# Patient Record
Sex: FEMALE | Race: WHITE | NOT HISPANIC OR LATINO | Employment: PART TIME | URBAN - METROPOLITAN AREA
[De-identification: names, ages, dates, MRNs, and addresses within clinical notes are randomized per-mention and may not be internally consistent; named-entity substitution may affect disease eponyms.]

---

## 2019-03-04 ENCOUNTER — TRANSFERRED RECORDS (OUTPATIENT)
Dept: HEALTH INFORMATION MANAGEMENT | Facility: CLINIC | Age: 34
End: 2019-03-04

## 2019-03-27 ENCOUNTER — OFFICE VISIT (OUTPATIENT)
Dept: OPHTHALMOLOGY | Facility: CLINIC | Age: 34
End: 2019-03-27
Attending: OPHTHALMOLOGY
Payer: COMMERCIAL

## 2019-03-27 DIAGNOSIS — H53.10 SUBJECTIVE VISUAL DISTURBANCE: Primary | ICD-10-CM

## 2019-03-27 DIAGNOSIS — H04.123 DRY EYE SYNDROME OF BOTH EYES: ICD-10-CM

## 2019-03-27 PROCEDURE — 92133 CPTRZD OPH DX IMG PST SGM ON: CPT | Mod: ZF | Performed by: OPHTHALMOLOGY

## 2019-03-27 PROCEDURE — 92083 EXTENDED VISUAL FIELD XM: CPT | Mod: ZF | Performed by: OPHTHALMOLOGY

## 2019-03-27 PROCEDURE — G0463 HOSPITAL OUTPT CLINIC VISIT: HCPCS | Mod: ZF

## 2019-03-27 RX ORDER — DEXTROAMPHETAMINE SACCHARATE, AMPHETAMINE ASPARTATE MONOHYDRATE, DEXTROAMPHETAMINE SULFATE AND AMPHETAMINE SULFATE 7.5; 7.5; 7.5; 7.5 MG/1; MG/1; MG/1; MG/1
30 CAPSULE, EXTENDED RELEASE ORAL DAILY
COMMUNITY
Start: 2019-03-04

## 2019-03-27 RX ORDER — RABEPRAZOLE SODIUM 20 MG/1
TABLET, DELAYED RELEASE ORAL DAILY
COMMUNITY
Start: 2017-05-26

## 2019-03-27 RX ORDER — LORAZEPAM 1 MG/1
TABLET ORAL
Refills: 0 | COMMUNITY
Start: 2019-01-29

## 2019-03-27 RX ORDER — AZATHIOPRINE 100 MG/1
150 TABLET ORAL DAILY
COMMUNITY

## 2019-03-27 ASSESSMENT — VISUAL ACUITY
OD_SC: 20/60
OD_CC: J7
METHOD: SNELLEN - LINEAR
OS_CC: J5
OD_SC+: -2
OS_SC: 20/100

## 2019-03-27 ASSESSMENT — CONF VISUAL FIELD
OS_INFERIOR_TEMPORAL_RESTRICTION: 3
OD_SUPERIOR_TEMPORAL_RESTRICTION: 3
OD_INFERIOR_NASAL_RESTRICTION: 3
OS_SUPERIOR_NASAL_RESTRICTION: 3
OD_INFERIOR_TEMPORAL_RESTRICTION: 3
OS_INFERIOR_NASAL_RESTRICTION: 3

## 2019-03-27 ASSESSMENT — EXTERNAL EXAM - RIGHT EYE: OD_EXAM: NORMAL

## 2019-03-27 ASSESSMENT — EXTERNAL EXAM - LEFT EYE: OS_EXAM: NORMAL

## 2019-03-27 ASSESSMENT — CUP TO DISC RATIO
OD_RATIO: 0.2
OS_RATIO: 0.2

## 2019-03-27 ASSESSMENT — TONOMETRY
OS_IOP_MMHG: 22
OD_IOP_MMHG: 20
IOP_METHOD: TONOPEN

## 2019-03-27 ASSESSMENT — SLIT LAMP EXAM - LIDS
COMMENTS: NORMAL
COMMENTS: NORMAL

## 2019-03-27 ASSESSMENT — REFRACTION_MANIFEST
OD_SPHERE: PLANO
OS_SPHERE: -1.25

## 2019-03-27 NOTE — LETTER
2019    RE: Andreina Moeller  : 1985  MRN: [id number]    Dear Dr. Hoff    Thank you for referring your patient, Andreina Moeller, to my neuro-ophthalmology clinic recently.  After a thorough neuro-ophthalmic history and examination, I came to the following conclusions:      1. Wegener's granulomatosis based upon serum testing and lacrimal biopsy previously- no evidence today for active disease affecting vision.    2. Dry eyes, both eyes with prominent slit lamp exam findings of superficial punctate epithelial erosions.  I suspect this may be the underlying etiology for her subjective visual disturbance.  I will see patient back in several months however to ensure that I am not missing a very early optic neuropathy.  There was no evidence of this today, however.    Andreina Moeller is a 33 year old female who presents for evaluation of eye pain, worsening vision, and peripheral vision changes. Patient has a history of Zoraida's granulomatosis (lacrimal gland biopsy proven in ) and has been on Azathioprine since for the past 7 years. Patient has had Rituximab  infusions in the past. Since 2018 patient has had worsening vision with eye pain with worse symptoms in the right than left. She reports having a goopy discharge in the morning with dryness in her lower eye lids. Reports her eye feels like sand paper and she has tried using artifical tears which provide immediate relief but states the pain returns not long after. She reports a history of headaches since the diagnosis of Wegener's and reports worsening headaches since last August. Reports monocular diplopia with her right eye. Reports floaters in the right eye which have been stable. No flashes.    Past ocular history: Lacrimal gland mass s/p biopsy in     Patient is P-ANCA positive on labwork from 2017 and has had multiple elevated blood tests for myeloperoxidase antibody dating back to 2011    Medications:  Azathioprine, Adderall, Lorazepam, Rabeprazole.  Last Rituxan infusion was August 2018.      She follows with rheumatologist Dr. Marifer Santiago at [facility].    Social history: Current smoker (1/4 pack per day)    MRI orbit with and without contrast (1/11/19)  Radiology read:  1.  Bilateral lacrimal gland inflammation noted on 4/15/2011 has resolved.  2.  Subtle asymmetry of the left lacrimal gland again visualized.  3.  The intraorbital contents are otherwise normal.  4.  Normal MRI appearance of the brain.    On exam visual acuity is 20/60 in the right and 20/100 in the left. Pupil exam is normal with no afferent pupillary defect. Color plates are full with 2/11 out of 3/11. She reports a history of some difficulty with color vision for years but not clear if congenitally partially color blind or not. She has some difficulty with the control plate number 12 as well. Extraocular movements are full.  Alignment is unremarkable. Anterior segment exam is normal in both eyes EXCEPT for moderate to severe superficial punctate epithelial erosions bilaterally in the inferior cornea. There is no anterior chamber cell or flare. Fundus exam is normal in both eyes. There is no optic disc swelling in either eye. Cranial nerves V1-3, 7,8,9,11, and 12 were normal bilaterally.     Visual field shows generalized depression in both eyes without a clear pattern. Retinal nerve fiber layer is normal in both eyes on peripapillary OCT.  Macular sections also appeared normal.    I reviewed 24-2 visual field testing from 3/4/19 from Dr. Hoff's office which also showed global depression in the right eye and in the left eye.  There was not a clear pattern of visual field loss on these fields either.    It is my impression that Ms. Moeller's symptoms are related to surface dryness rather than active GPA / Wegener's.  I did not arrive at this decision without careful evaluation for orbital or optic nerve disease, however. She has  significant punctate epithelial erosions in both eyes. Her monocular diplopia and symptoms are consistent with surface disease. PAP testing performed by myself in clinic resulted in J1+ (-2) (20/20 equivalent) in the right eye and J1 (-1) (20/25 equivalent) in the left eye- we would not expect such a profound improvement on PAP if she had an optic neuropathy causing her distance visual acuity of 20/60 in the right eye and 20/100 in the left eye. I personally reviewed her MRI orbits with and without contrast from Jan 2019. There are no signs of active Zoraida's on her MRI consistent with the radiology read. Exam of her optic nerve and retina are also normal including OCT. I have recommended aggressive lubrication with artifical tears throughout the day and artifical tears ointment at bedtime. I will have her follow up with me in 2 months with repeat visual field and retinal nerve fiber layer OCT to ensure we are not missing a very early optic neuropathy.    Letter to Dr. Hoff.    Again, thank you for trusting me with the care of your patient.  For further exam details, please feel free to contact our office for additional records.  If you wish to contact me regarding this patient please email me at [email or web address] or give my clinic a call to arrange a phone conversation.    Sincerely,    Kelton Wheeler MD  , Neuro-Ophthalmology and Adult Strabismus Surgery  The [facility] VIEIRA and Miya Rodriguez Chair in Neuro-Ophthalmology  Department of Ophthalmology and Visual Neurosciences  [facility]    DX: dry eye syndrome, GPA diagnosis

## 2019-03-27 NOTE — NURSING NOTE
Chief Complaints and History of Present Illnesses   Patient presents with     New Patient     Chief Complaint(s) and History of Present Illness(es)     New patient is here for blurry vision.  She notes that in the last six months she has had blurry right eye vision, right eye discharge, intermittent double vision and right eye discomfort.  She has Jacob's.  The patient had a biopsy in 2010 of the right lacrimal gland.  FORREST Childress 1:42 PM 03/27/2019

## 2019-03-27 NOTE — PROGRESS NOTES
1. Wegener's granulomatosis based upon serum testing and lacrimal biopsy previously- no evidence today for active disease affecting vision.    2. Dry eyes, both eyes with prominent slit lamp exam findings of superficial punctate epithelial erosions.  I suspect this may be the underlying etiology for her subjective visual disturbance.  I will see patient back in several months however to ensure that I am not missing a very early optic neuropathy.  There was no evidence of this today, however.    Andreina Moeller is a 33 year old female who presents for evaluation of eye pain, worsening vision, and peripheral vision changes. Patient has a history of Zoraida's granulomatosis (lacrimal gland biopsy proven in 2012) and has been on Azathioprine since for the past 7 years. Patient has had Rituximab  infusions in the past. Since August of 2018 patient has had worsening vision with eye pain with worse symptoms in the right than left. She reports having a goopy discharge in the morning with dryness in her lower eye lids. Reports her eye feels like sand paper and she has tried using artifical tears which provide immediate relief but states the pain returns not long after. She reports a history of headaches since the diagnosis of Wegener's and reports worsening headaches since last August. Reports monocular diplopia with her right eye. Reports floaters in the right eye which have been stable. No flashes.    Past ocular history: Lacrimal gland mass s/p biopsy in 2012    Patient is P-ANCA positive on labwork from 05/2017 and has had multiple elevated blood tests for myeloperoxidase antibody dating back to 05/2011    Medications: Azathioprine, Adderall, Lorazepam, Rabeprazole.  Last Rituxan infusion was August 2018.      She follows with rheumatologist Dr. Marifer Santiago at [facility].    Social history: Current smoker (1/4 pack per day)    MRI orbit with and without contrast (1/11/19)  Radiology read:  1.  Bilateral lacrimal  gland inflammation noted on 4/15/2011 has resolved.  2.  Subtle asymmetry of the left lacrimal gland again visualized.  3.  The intraorbital contents are otherwise normal.  4.  Normal MRI appearance of the brain.    On exam visual acuity is 20/60 in the right and 20/100 in the left. Pupil exam is normal with no afferent pupillary defect. Color plates are full with 2/11 out of 3/11. She reports a history of some difficulty with color vision for years but not clear if congenitally partially color blind or not. She has some difficulty with the control plate number 12 as well. Extraocular movements are full.  Alignment is unremarkable. Anterior segment exam is normal in both eyes EXCEPT for moderate to severe superficial punctate epithelial erosions bilaterally in the inferior cornea. There is no anterior chamber cell or flare. Fundus exam is normal in both eyes. There is no optic disc swelling in either eye. Cranial nerves V1-3, 7,8,9,11, and 12 were normal bilaterally.     Visual field shows generalized depression in both eyes without a clear pattern. Retinal nerve fiber layer is normal in both eyes on peripapillary OCT.  Macular sections also appeared normal.    I reviewed 24-2 visual field testing from 3/4/19 from Dr. Hoff's office which also showed global depression in the right eye and in the left eye.  There was not a clear pattern of visual field loss on these fields either.    It is my impression that Ms. Moeller's symptoms are related to surface dryness rather than active GPA / Wegener's.  I did not arrive at this decision without careful evaluation for orbital or optic nerve disease, however. She has significant punctate epithelial erosions in both eyes. Her monocular diplopia and symptoms are consistent with surface disease. PAP testing performed by myself in clinic resulted in J1+ (-2) (20/20 equivalent) in the right eye and J1 (-1) (20/25 equivalent) in the left eye- we would not expect such a  profound improvement on PAP if she had an optic neuropathy causing her distance visual acuity of 20/60 in the right eye and 20/100 in the left eye. I personally reviewed her MRI orbits with and without contrast from Jan 2019. There are no signs of active Zoraida's on her MRI consistent with the radiology read. Exam of her optic nerve and retina are also normal including OCT. I have recommended aggressive lubrication with artifical tears throughout the day and artifical tears ointment at bedtime. I will have her follow up with me in 2 months with repeat visual field and retinal nerve fiber layer OCT to ensure we are not missing a very early optic neuropathy.    Letter to Dr. Hoff.     I spent a total of 60 minutes face to face with Andreina Moeller during today's office visit.  Over 50% of this time was spent counseling the patient and/or coordinating care regarding her GPA diagnosis and bilateral blurred vision.    Complete documentation of historical and exam elements from today's encounter can be found in the full encounter summary report (not reduplicated in this progress note).  I personally obtained the chief complaint(s) and history of present illness.  I confirmed and edited as necessary the review of systems, past medical/surgical history, family history, social history, and examination findings as documented by others; and I examined the patient myself.  I personally reviewed the relevant tests, images, and reports as documented above.  I formulated and edited as necessary the assessment and plan and discussed the findings and management plan with the patient and family.  I personally reviewed the ophthalmic test(s) associated with this encounter, agree with the interpretation(s) as documented by the resident/fellow, and have edited the corresponding report(s) as necessary.     MD Clinton Green MD  PGY-3 Ophthalmology Resident  [phone]

## 2019-04-01 ENCOUNTER — TRANSFERRED RECORDS (OUTPATIENT)
Dept: HEALTH INFORMATION MANAGEMENT | Facility: CLINIC | Age: 34
End: 2019-04-01

## 2019-04-19 ENCOUNTER — HEALTH MAINTENANCE LETTER (OUTPATIENT)
Age: 34
End: 2019-04-19

## 2019-06-19 ENCOUNTER — TRANSFERRED RECORDS (OUTPATIENT)
Dept: HEALTH INFORMATION MANAGEMENT | Facility: CLINIC | Age: 34
End: 2019-06-19

## 2019-11-04 ENCOUNTER — HEALTH MAINTENANCE LETTER (OUTPATIENT)
Age: 34
End: 2019-11-04

## 2020-11-03 ENCOUNTER — MEDICAL CORRESPONDENCE (OUTPATIENT)
Dept: HEALTH INFORMATION MANAGEMENT | Facility: CLINIC | Age: 35
End: 2020-11-03

## 2020-11-22 ENCOUNTER — HEALTH MAINTENANCE LETTER (OUTPATIENT)
Age: 35
End: 2020-11-22

## 2021-05-25 ENCOUNTER — RECORDS - HEALTHEAST (OUTPATIENT)
Dept: ADMINISTRATIVE | Facility: CLINIC | Age: 36
End: 2021-05-25

## 2021-05-31 ENCOUNTER — RECORDS - HEALTHEAST (OUTPATIENT)
Dept: ADMINISTRATIVE | Facility: CLINIC | Age: 36
End: 2021-05-31

## 2021-06-03 ENCOUNTER — RECORDS - HEALTHEAST (OUTPATIENT)
Dept: ADMINISTRATIVE | Facility: CLINIC | Age: 36
End: 2021-06-03

## 2021-09-18 ENCOUNTER — HEALTH MAINTENANCE LETTER (OUTPATIENT)
Age: 36
End: 2021-09-18

## 2022-01-08 ENCOUNTER — HEALTH MAINTENANCE LETTER (OUTPATIENT)
Age: 37
End: 2022-01-08

## 2022-01-25 ENCOUNTER — TRANSFERRED RECORDS (OUTPATIENT)
Dept: HEALTH INFORMATION MANAGEMENT | Facility: CLINIC | Age: 37
End: 2022-01-25
Payer: COMMERCIAL

## 2022-01-25 ENCOUNTER — TELEPHONE (OUTPATIENT)
Dept: OPHTHALMOLOGY | Facility: CLINIC | Age: 37
End: 2022-01-25
Payer: COMMERCIAL

## 2022-01-25 NOTE — TELEPHONE ENCOUNTER
Called and spoke to Andreina Hdz saw Dr. Wheeler in 2019     Since then Dr. Modesta Alanis has been following this pt.     Since then in the last 2 months Andreina's vision has decrease and the last six it has diminished more. Dr. Alanis would like this pt to be seen with Dr. Wheeler.    RT Eye Pressure was 32 a month ago     Extreme Migraines    Red / Itchy/ painful to touch at times    Floaters     Andreina almost failed her vision/ color test     She dose not see well at night     Can't focus     She can't see dark color well     Her next vision test is 2/15 with Dr. Alanis     I asked her if she have [facility] fax her records to us.     Alissa Perez Communication Facilitator on 1/25/2022 at 11:45 AM

## 2022-01-25 NOTE — TELEPHONE ENCOUNTER
[facility] Call Center    Phone Message    May a detailed message be left on voicemail: yes     Reason for Call: Symptoms or Concerns     If patient has red-flag symptoms, warm transfer to triage line    Current symptom or concern: drastic Changes in Vision, painful, dry eyes    Symptoms have been present for:  several month(s)    Has patient previously been seen for this? Yes    By : Dr Wheeler    Date: 3/27/2019    Are there any new or worsening symptoms? Yes      Action Taken: Message routed to:  [facility] eye    Travel Screening: Not Applicable

## 2022-01-28 ENCOUNTER — TELEPHONE (OUTPATIENT)
Dept: OPHTHALMOLOGY | Facility: CLINIC | Age: 37
End: 2022-01-28
Payer: COMMERCIAL

## 2022-01-28 NOTE — TELEPHONE ENCOUNTER
Do you want me to schedule with Dr. Wheeler? Her notes were sent to you.     Alissa Perez Communication Facilitator on 1/28/2022 at 10:20 AM

## 2022-01-28 NOTE — TELEPHONE ENCOUNTER
[facility] Call Center    Phone Message    May a detailed message be left on voicemail: yes     Reason for Call: Other: Pt.requesting to talk to a clinic nurse about eye symptoms.  Pt is in the process of getting her records faxed to Dr. Wheeler. Pt previously spoke with Alissa regarding her situation, per Pt.  Please call Pt back to discuss.     Thank you.    Action Taken: Message routed to:  [facility] EYE    Travel Screening: Not Applicable

## 2022-01-28 NOTE — TELEPHONE ENCOUNTER
Called and spoke to Andreina Hdz an appt with Dr. Wilkerson for 2/18 @ 1230 pm. I mailed out a new pt packet with time, date and a map.     Andreina was ok with this appt.     Alissa Perez Communication Facilitator on 1/28/2022 at 11:11 AM

## 2022-01-31 ENCOUNTER — TRANSCRIBE ORDERS (OUTPATIENT)
Dept: OTHER | Age: 37
End: 2022-01-31

## 2022-01-31 DIAGNOSIS — H53.50 COLOR VISION DEFECT: ICD-10-CM

## 2022-01-31 DIAGNOSIS — H54.7 WORSENING VISION: Primary | ICD-10-CM

## 2022-02-18 ENCOUNTER — OFFICE VISIT (OUTPATIENT)
Dept: OPHTHALMOLOGY | Facility: CLINIC | Age: 37
End: 2022-02-18
Attending: OPHTHALMOLOGY
Payer: COMMERCIAL

## 2022-02-18 DIAGNOSIS — H53.10 SUBJECTIVE VISUAL DISTURBANCE: Primary | ICD-10-CM

## 2022-02-18 DIAGNOSIS — H53.50 COLOR VISION DEFECT: ICD-10-CM

## 2022-02-18 DIAGNOSIS — H54.7 WORSENING VISION: ICD-10-CM

## 2022-02-18 DIAGNOSIS — H53.10 SUBJECTIVE VISUAL DISTURBANCE: ICD-10-CM

## 2022-02-18 PROCEDURE — 92083 EXTENDED VISUAL FIELD XM: CPT | Performed by: INTERNAL MEDICINE

## 2022-02-18 PROCEDURE — G0463 HOSPITAL OUTPT CLINIC VISIT: HCPCS | Mod: 25

## 2022-02-18 PROCEDURE — 99214 OFFICE O/P EST MOD 30 MIN: CPT | Performed by: INTERNAL MEDICINE

## 2022-02-18 PROCEDURE — 92133 CPTRZD OPH DX IMG PST SGM ON: CPT | Performed by: INTERNAL MEDICINE

## 2022-02-18 ASSESSMENT — SLIT LAMP EXAM - LIDS
COMMENTS: NORMAL
COMMENTS: NORMAL

## 2022-02-18 ASSESSMENT — CUP TO DISC RATIO
OS_RATIO: 0.2
OD_RATIO: 0.2

## 2022-02-18 ASSESSMENT — TONOMETRY
OS_IOP_MMHG: 18
OS_IOP_MMHG: 25
OD_IOP_MMHG: 27
IOP_METHOD: ICARE
IOP_METHOD: APPLANATION
OD_IOP_MMHG: 20

## 2022-02-18 ASSESSMENT — CONF VISUAL FIELD
OD_INFERIOR_NASAL_RESTRICTION: 3
OS_NORMAL: 1
OD_INFERIOR_TEMPORAL_RESTRICTION: 3
METHOD: COUNTING FINGERS

## 2022-02-18 ASSESSMENT — VISUAL ACUITY
OD_PH_SC: 20/50
OS_SC: 20/80
METHOD: SNELLEN - LINEAR
OD_SC: 20/60
OS_PH_SC: 20/60
OD_PH_SC+: -1
OS_PH_SC+: +1

## 2022-02-18 ASSESSMENT — EXTERNAL EXAM - LEFT EYE: OS_EXAM: NORMAL

## 2022-02-18 ASSESSMENT — EXTERNAL EXAM - RIGHT EYE: OD_EXAM: NORMAL

## 2022-02-18 NOTE — PROGRESS NOTES
1. Dry eye syndrome -patient presented for evaluation of intermittent blurred vision in both eyes is worse in the last several months.  She continues to have marked anterior segment disease 3+ punctate epithelial erosions in both eyes and symptoms that are consistent with dry eye syndrome.  There is no evidence of posterior segment disease on exam or OCT today.  Visual fields continues to demonstrate nonspecific deficits that could be consistent with refractive disease.  Her treatment-refractive dry eye could be secondary to underlying Sjogren's as her anti-SSA in 2019 came back markedly elevated. She has an appointment for follow-up with rheumatology in April.    Advised her to increase use of preservative-free artificial tears, use refresh p.m. ointment before bed.  Could consider trial of Xiidra and or punctual plugs. She was recently started on Travatan which could theoretically worsen her eye irritation and she was advised to discuss alternatives for ocular hypertension with her regular ophthalmologist.    Follow-up with neuro-ophthalmology as needed.     Andreina Moeller is a pleasant 36 year old White female who presents to my neuro-ophthalmology clinic today for evaluation of blurred vision in both eyes.    HPI:  Patient reports that for the last several months she has noticed a significant worsening in the clarity of her vision in both eyes, right greater than left associated with redness, tearing, retro-orbital pain, and morning madarosis. Severe glare at nighttime with headlights to the point where she does not want to drive. She denies significant worsening pain with eye movement.  Occasional monocular double vision but denies binocular double vision or other transient or persistent neurologic symptoms. She has been using Restasis in both eyes twice a day and artificial tears 4-6 times per day every day.  Think these have helped somewhat but eyes are still very irritated and dry.    She has been on  Travatan in both eyes for two months for ocular hypertension.  Possibly has noted some worsening irritation since initiation of the drops.    Independent historians: Patient    Review of outside testing: Reviewed old imaging including MRI and CT. reviewed prior labs performed in 2019 that demonstrated elevated SSA antibody    My interpretation performed today of outside testing: Agree with interpretations listed in imaging reports    Review of outside clinical notes: Reviewed Dr. Wheeler's note from 3/27/2019.    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  Has follow-up scheduled with rheumatology in April.    Family history / social history: Reviewed and nonpertinent.    Exam:  Visual acuity without correction was 20/50 ph in the right eye and 20/60 ph in the left eye. IOP was 20 in the right eye and 18 in the left eye. Visual fields were full in both eyes. Ocular motility was full in all gaze directions. Color plates were 10/11 in the right eye and 4/11 in the left eye.  Pupils were equal, round and reactive to light with no RAPD.     Strabismus exam: full,ortho  Anterior segment exam: marked to severe PEE left > right  Dilated fundus exam: normal each eye no evidence of optic neuropathy    Tests ordered and interpreted today:  OCT rNFL demonstrated a mean NFL thickness of 103 in the right eye and 104 in the left eye. Thickness profile demonstrated normal thickness in all quadrants in the right eye and normal thickness in all quadrants in the left eye.  OCT macula performed that did not reveal significant retinal pathology or change from prior  VF: Glaucoma TOP visual field was performed. Non-specific deficits noted in both eyes..         44 minutes spent on the date of encounter doing chart review, history and exam, documentation, and further activities as noted above.     Bairon Wilkerson,    PGY-5 Neuro-Ophthalmology Fellow    Attending Physician Attestation:   Complete documentation of historical and exam elements from today's encounter can be found in the full encounter summary report (not reduplicated in this progress note).  I personally obtained the chief complaint(s) and history of present illness.  I confirmed and edited as necessary the review of systems, past medical/surgical history, family history, social history, and examination findings as documented by others; and I examined the patient myself.  I personally reviewed the relevant tests, images, and reports as documented above.  I formulated and edited as necessary the assessment and plan and discussed the findings and management plan with the patient and family. - Bairon Wilkerson, DO

## 2022-11-20 ENCOUNTER — HEALTH MAINTENANCE LETTER (OUTPATIENT)
Age: 37
End: 2022-11-20

## 2023-04-15 ENCOUNTER — HEALTH MAINTENANCE LETTER (OUTPATIENT)
Age: 38
End: 2023-04-15

## 2023-11-20 ENCOUNTER — HOSPITAL ENCOUNTER (EMERGENCY)
Facility: CLINIC | Age: 38
Discharge: HOME | End: 2023-11-21
Attending: FAMILY MEDICINE | Admitting: FAMILY MEDICINE
Payer: COMMERCIAL

## 2023-11-20 ENCOUNTER — APPOINTMENT (OUTPATIENT)
Dept: CT IMAGING | Facility: CLINIC | Age: 38
End: 2023-11-20
Attending: FAMILY MEDICINE
Payer: COMMERCIAL

## 2023-11-20 DIAGNOSIS — R10.9 ABDOMINAL PAIN OF UNKNOWN ETIOLOGY: ICD-10-CM

## 2023-11-20 PROBLEM — M31.30: Status: ACTIVE | Noted: 2023-11-20

## 2023-11-20 PROBLEM — F41.0 PANIC ATTACK: Status: ACTIVE | Noted: 2023-05-30

## 2023-11-20 PROBLEM — G56.01 CARPAL TUNNEL SYNDROME OF RIGHT WRIST: Status: ACTIVE | Noted: 2022-09-08

## 2023-11-20 PROBLEM — F39 EPISODIC MOOD DISORDER (H): Status: ACTIVE | Noted: 2020-01-02

## 2023-11-20 PROBLEM — F41.1 GENERALIZED ANXIETY DISORDER: Status: ACTIVE | Noted: 2019-03-20

## 2023-11-20 PROBLEM — M35.01: Status: ACTIVE | Noted: 2023-11-20

## 2023-11-20 PROBLEM — I77.6 VASCULITIS (H): Status: ACTIVE | Noted: 2023-08-08

## 2023-11-20 PROBLEM — Q51.810 ARCUATE UTERUS: Status: ACTIVE | Noted: 2022-09-21

## 2023-11-20 PROBLEM — F90.0 ATTENTION DEFICIT HYPERACTIVITY DISORDER (ADHD), PREDOMINANTLY INATTENTIVE TYPE: Status: ACTIVE | Noted: 2018-10-26

## 2023-11-20 LAB
ALBUMIN SERPL BCG-MCNC: 4.5 G/DL (ref 3.5–5.2)
ALBUMIN UR-MCNC: 30 MG/DL
ALP SERPL-CCNC: 60 U/L (ref 40–150)
ALT SERPL W P-5'-P-CCNC: 15 U/L (ref 0–50)
AMORPH CRY #/AREA URNS HPF: ABNORMAL /HPF
ANION GAP SERPL CALCULATED.3IONS-SCNC: 12 MMOL/L (ref 7–15)
APPEARANCE UR: ABNORMAL
AST SERPL W P-5'-P-CCNC: 13 U/L (ref 0–45)
BASOPHILS # BLD AUTO: 0 10E3/UL (ref 0–0.2)
BASOPHILS NFR BLD AUTO: 0 %
BILIRUB SERPL-MCNC: 0.2 MG/DL
BILIRUB UR QL STRIP: NEGATIVE
BUN SERPL-MCNC: 15.1 MG/DL (ref 6–20)
CALCIUM SERPL-MCNC: 9.3 MG/DL (ref 8.6–10)
CHLORIDE SERPL-SCNC: 105 MMOL/L (ref 98–107)
COLOR UR AUTO: YELLOW
CREAT SERPL-MCNC: 0.66 MG/DL (ref 0.51–0.95)
DEPRECATED HCO3 PLAS-SCNC: 20 MMOL/L (ref 22–29)
EGFRCR SERPLBLD CKD-EPI 2021: >90 ML/MIN/1.73M2
EOSINOPHIL # BLD AUTO: 0 10E3/UL (ref 0–0.7)
EOSINOPHIL NFR BLD AUTO: 0 %
ERYTHROCYTE [DISTWIDTH] IN BLOOD BY AUTOMATED COUNT: 11.8 % (ref 10–15)
GLUCOSE SERPL-MCNC: 143 MG/DL (ref 70–99)
GLUCOSE UR STRIP-MCNC: NEGATIVE MG/DL
HCG UR QL: NEGATIVE
HCT VFR BLD AUTO: 42.6 % (ref 35–47)
HGB BLD-MCNC: 14.5 G/DL (ref 11.7–15.7)
HGB UR QL STRIP: NEGATIVE
HOLD SPECIMEN: NORMAL
HOLD SPECIMEN: NORMAL
IMM GRANULOCYTES # BLD: 0 10E3/UL
IMM GRANULOCYTES NFR BLD: 0 %
KETONES UR STRIP-MCNC: NEGATIVE MG/DL
LEUKOCYTE ESTERASE UR QL STRIP: ABNORMAL
LYMPHOCYTES # BLD AUTO: 0.7 10E3/UL (ref 0.8–5.3)
LYMPHOCYTES NFR BLD AUTO: 9 %
MCH RBC QN AUTO: 30.9 PG (ref 26.5–33)
MCHC RBC AUTO-ENTMCNC: 34 G/DL (ref 31.5–36.5)
MCV RBC AUTO: 91 FL (ref 78–100)
MONOCYTES # BLD AUTO: 0.4 10E3/UL (ref 0–1.3)
MONOCYTES NFR BLD AUTO: 5 %
MUCOUS THREADS #/AREA URNS LPF: PRESENT /LPF
NEUTROPHILS # BLD AUTO: 6.3 10E3/UL (ref 1.6–8.3)
NEUTROPHILS NFR BLD AUTO: 86 %
NITRATE UR QL: NEGATIVE
NRBC # BLD AUTO: 0 10E3/UL
NRBC BLD AUTO-RTO: 0 /100
PH UR STRIP: 7 [PH] (ref 5–7)
PLATELET # BLD AUTO: 379 10E3/UL (ref 150–450)
POTASSIUM SERPL-SCNC: 4.2 MMOL/L (ref 3.4–5.3)
PROT SERPL-MCNC: 7.6 G/DL (ref 6.4–8.3)
RBC # BLD AUTO: 4.7 10E6/UL (ref 3.8–5.2)
RBC URINE: 0 /HPF
SODIUM SERPL-SCNC: 137 MMOL/L (ref 135–145)
SP GR UR STRIP: 1.02 (ref 1–1.03)
SQUAMOUS EPITHELIAL: 3 /HPF
UROBILINOGEN UR STRIP-MCNC: NORMAL MG/DL
WBC # BLD AUTO: 7.4 10E3/UL (ref 4–11)
WBC URINE: 3 /HPF

## 2023-11-20 PROCEDURE — 250N000011 HC RX IP 250 OP 636: Mod: JZ | Performed by: FAMILY MEDICINE

## 2023-11-20 PROCEDURE — 85025 COMPLETE CBC W/AUTO DIFF WBC: CPT | Performed by: FAMILY MEDICINE

## 2023-11-20 PROCEDURE — 81001 URINALYSIS AUTO W/SCOPE: CPT | Performed by: EMERGENCY MEDICINE

## 2023-11-20 PROCEDURE — 258N000003 HC RX IP 258 OP 636: Performed by: FAMILY MEDICINE

## 2023-11-20 PROCEDURE — 250N000009 HC RX 250: Performed by: FAMILY MEDICINE

## 2023-11-20 PROCEDURE — 96375 TX/PRO/DX INJ NEW DRUG ADDON: CPT

## 2023-11-20 PROCEDURE — 80053 COMPREHEN METABOLIC PANEL: CPT | Performed by: FAMILY MEDICINE

## 2023-11-20 PROCEDURE — 250N000011 HC RX IP 250 OP 636: Performed by: FAMILY MEDICINE

## 2023-11-20 PROCEDURE — 74177 CT ABD & PELVIS W/CONTRAST: CPT

## 2023-11-20 PROCEDURE — 81025 URINE PREGNANCY TEST: CPT | Performed by: FAMILY MEDICINE

## 2023-11-20 PROCEDURE — 96374 THER/PROPH/DIAG INJ IV PUSH: CPT | Mod: 59

## 2023-11-20 PROCEDURE — 80053 COMPREHEN METABOLIC PANEL: CPT | Performed by: EMERGENCY MEDICINE

## 2023-11-20 PROCEDURE — 36415 COLL VENOUS BLD VENIPUNCTURE: CPT | Performed by: EMERGENCY MEDICINE

## 2023-11-20 PROCEDURE — 99284 EMERGENCY DEPT VISIT MOD MDM: CPT | Performed by: FAMILY MEDICINE

## 2023-11-20 PROCEDURE — 96361 HYDRATE IV INFUSION ADD-ON: CPT

## 2023-11-20 PROCEDURE — 99285 EMERGENCY DEPT VISIT HI MDM: CPT | Mod: 25

## 2023-11-20 PROCEDURE — 81001 URINALYSIS AUTO W/SCOPE: CPT | Performed by: FAMILY MEDICINE

## 2023-11-20 PROCEDURE — 85025 COMPLETE CBC W/AUTO DIFF WBC: CPT | Performed by: EMERGENCY MEDICINE

## 2023-11-20 RX ORDER — IOPAMIDOL 755 MG/ML
77 INJECTION, SOLUTION INTRAVASCULAR ONCE
Status: COMPLETED | OUTPATIENT
Start: 2023-11-20 | End: 2023-11-20

## 2023-11-20 RX ORDER — HYDROMORPHONE HYDROCHLORIDE 1 MG/ML
0.5 INJECTION, SOLUTION INTRAMUSCULAR; INTRAVENOUS; SUBCUTANEOUS
Status: DISCONTINUED | OUTPATIENT
Start: 2023-11-20 | End: 2023-11-21 | Stop reason: HOSPADM

## 2023-11-20 RX ADMIN — SODIUM CHLORIDE 59 ML: 9 INJECTION, SOLUTION INTRAVENOUS at 23:19

## 2023-11-20 RX ADMIN — PROMETHAZINE HYDROCHLORIDE 12.5 MG: 25 INJECTION INTRAMUSCULAR; INTRAVENOUS at 23:36

## 2023-11-20 RX ADMIN — HYDROMORPHONE HYDROCHLORIDE 0.5 MG: 1 INJECTION, SOLUTION INTRAMUSCULAR; INTRAVENOUS; SUBCUTANEOUS at 23:36

## 2023-11-20 RX ADMIN — SODIUM CHLORIDE 1000 ML: 9 INJECTION, SOLUTION INTRAVENOUS at 21:51

## 2023-11-20 RX ADMIN — IOPAMIDOL 77 ML: 755 INJECTION, SOLUTION INTRAVENOUS at 23:19

## 2023-11-20 ASSESSMENT — ACTIVITIES OF DAILY LIVING (ADL)
ADLS_ACUITY_SCORE: 35
ADLS_ACUITY_SCORE: 35

## 2023-11-21 VITALS
OXYGEN SATURATION: 100 % | BODY MASS INDEX: 26.26 KG/M2 | HEART RATE: 64 BPM | SYSTOLIC BLOOD PRESSURE: 109 MMHG | WEIGHT: 157.6 LBS | RESPIRATION RATE: 18 BRPM | TEMPERATURE: 98.3 F | HEIGHT: 65 IN | DIASTOLIC BLOOD PRESSURE: 80 MMHG

## 2023-11-21 RX ORDER — OXYCODONE HYDROCHLORIDE 5 MG/1
5 TABLET ORAL EVERY 6 HOURS PRN
Qty: 10 TABLET | Refills: 0 | Status: SHIPPED | OUTPATIENT
Start: 2023-11-21

## 2023-11-21 RX ORDER — PROMETHAZINE HYDROCHLORIDE 25 MG/1
25 TABLET ORAL EVERY 6 HOURS PRN
Qty: 20 TABLET | Refills: 0 | Status: SHIPPED | OUTPATIENT
Start: 2023-11-21

## 2023-11-21 NOTE — DISCHARGE INSTRUCTIONS
The cause of your abdominal pain is uncertain at this point however imaging and lab diagnostics are reassuring that there is no medically or surgically emergent problem to explain it.  I would recommend outpatient referral to general surgery for their evaluation.  Simple pain medications such as acetaminophen or ibuprofen as baseline pain medications and you may use the oxycodone prescribed for breakthrough pain sparingly and for the short-term only.  Phenergan may be used for nausea/vomiting.  Please follow-up in clinic with your primary care provider.

## 2023-11-21 NOTE — ED NOTES
"Pt states that she has multiple autoimmune disorders and was seen at [facility] recently with a \"bad experience\" Pt states that she passed out at home due to abdominal pain and went to [facility] pt states \"all they diagnosed me with is colitis and a collapsed ovary\" since discharge pt is still have left side abdominal pain that wraps around the back on the left side. Pt report  chills and sweating, increased heart rate, difficulty voiding. Pt states that its hard for her to void and \"not much comes out\"     Writer bladder scanned pt and the most recorded was 64 mL.   "

## 2023-11-21 NOTE — ED TRIAGE NOTES
Pt presents for evaluation of flank pain in addition to chronic morbidities. [facility] and started on steroids and abx for collitis/crohns. Here for increasing pain and nausea.     Triage Assessment (Adult)       Row Name 11/20/23 3002          Triage Assessment    Airway WDL WDL        Respiratory WDL    Respiratory WDL WDL        Skin Circulation/Temperature WDL    Skin Circulation/Temperature WDL WDL        Cardiac WDL    Cardiac WDL WDL        Peripheral/Neurovascular WDL    Peripheral Neurovascular WDL WDL        Cognitive/Neuro/Behavioral WDL    Cognitive/Neuro/Behavioral WDL WDL

## 2023-11-21 NOTE — ED PROVIDER NOTES
"  History     Chief Complaint   Patient presents with    Flank Pain            HPI  Andreina Moeller is a 38 year old female, past medical history is significant for Wegener's granulomatosis without renal involvement, Sjogren syndrome, vasculitis, panic attacks, arcuate uterus, episodic mood disorder, generalized anxiety disorder, ADHD, tobacco use disorder, ANCA associated vasculitis, pseudotumor, presents to the emergency department with concerns of left-sided abdominal pain that dates back approximately 3-4 months and was seen recently this past week at [facility].  The patient states that she is a very complicated patient with multiple autoimmune disorders and was seen at [facility] this past week and had a \"bad experience\".  She states that she was seen for her abdominal pain and was diagnosed with colitis and a collapsed ovary and discharged on oxycodone 10 mg for pain which she is now out of, ongoing pain despite the use of the oxycodone, and also chief concern of decreased urine output over the past week.  She states she cannot remember the last time that she urinated a normal amount.  Only small amounts.  No urge or dysuria.  No hematuria.  She denies fever chills or sweats.  Ladder scan revealed 64 cc in her bladder.  No concerns of change in bowel habit.      Allergies:  Allergies   Allergen Reactions    Hydrocodone Other (See Comments)     Itch  Tolerates percocet    Hydrocodone-Acetaminophen Itching    Methotrexate Muscle Pain (Myalgia) and Other (See Comments)     Nausea, headache  Nausea, headache      Prednisone Nausea and Vomiting       Problem List:    Patient Active Problem List    Diagnosis Date Noted    Wegener's granulomatosis without renal involvement (H) 11/20/2023     Priority: Medium    Sjogren syndrome with keratoconjunctivitis (H24) 11/20/2023     Priority: Medium    Vasculitis (H24) 08/08/2023     Priority: Medium    Panic attack 05/30/2023     Priority: Medium    Arcuate uterus " 09/21/2022     Priority: Medium     Formatting of this note might be different from the original. Seen on [facility]      Carpal tunnel syndrome of right wrist 09/08/2022     Priority: Medium    Episodic mood disorder (H24) 01/02/2020     Priority: Medium    Generalized anxiety disorder 03/20/2019     Priority: Medium    Attention deficit hyperactivity disorder (ADHD), predominantly inattentive type 10/26/2018     Priority: Medium     Formatting of this note might be different from the original. Started on Adderall in [place].      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 Orbital Biopsy + with Dr. Barreto June 2011 S/p removal          Past Medical History:    Past Medical History:   Diagnosis Date    Wegener's granulomatosis (granulomatosis with polyangiitis)        Past Surgical History:    Past Surgical History:   Procedure Laterality Date    lacrimal gland biopsy  2010       Family History:    Family History   Problem Relation Age of Onset    Glaucoma Maternal Grandmother     Retinal detachment Maternal Grandmother     Macular Degeneration Maternal Grandfather     Glaucoma Maternal Grandfather     Diabetes Maternal Grandfather     Diabetes Mother     Hypertension Father     Hypertension Paternal Grandfather        Social History:  Marital Status:  Single [1]  Social History     Tobacco Use    Smoking status: Every Day    Smokeless tobacco: Never        Medications:    oxyCODONE (ROXICODONE) 5 MG tablet  promethazine (PHENERGAN) 25 MG tablet  amphetamine-dextroamphetamine (ADDERALL XR) 30 MG 24 hr capsule  azaTHIOprine 100 MG TABS  LORazepam (ATIVAN) 1 MG tablet  RABEprazole (ACIPHEX) 20 MG EC tablet          Review of Systems   All other systems reviewed and are negative.      Physical Exam   BP:  "127/81  Pulse: 103  Temp: 98.3  F (36.8  C)  Resp: 18  Height: 165.1 cm (5' 5\")  Weight: 71.5 kg (157 lb 9.6 oz)  SpO2: 98 %      Physical Exam  Vitals and nursing note reviewed.   Constitutional:       General: She is not in acute distress.     Appearance: Normal appearance. She is normal weight. She is not ill-appearing.   HENT:      Head: Normocephalic and atraumatic.      Right Ear: Tympanic membrane normal.      Left Ear: Tympanic membrane normal.      Nose: Nose normal.      Mouth/Throat:      Mouth: Mucous membranes are dry.      Pharynx: Oropharynx is clear.   Eyes:      Extraocular Movements: Extraocular movements intact.      Conjunctiva/sclera: Conjunctivae normal.      Pupils: Pupils are equal, round, and reactive to light.   Cardiovascular:      Rate and Rhythm: Normal rate and regular rhythm.      Pulses: Normal pulses.      Heart sounds: Normal heart sounds.   Pulmonary:      Effort: Pulmonary effort is normal.      Breath sounds: Normal breath sounds.   Abdominal:      Comments: Soft, bowel sounds present tender left lower left upper quadrant as well as left CVA.  No rebound or guarding.  Distractible.     Musculoskeletal:         General: Normal range of motion.      Cervical back: Normal range of motion and neck supple.   Skin:     General: Skin is warm and dry.      Capillary Refill: Capillary refill takes less than 2 seconds.   Neurological:      General: No focal deficit present.      Mental Status: She is alert and oriented to person, place, and time.   Psychiatric:         Mood and Affect: Mood normal.         Behavior: Behavior normal.         ED Course                 Procedures       Results for orders placed or performed during the hospital encounter of 11/20/23 (from the past 24 hour(s))   UA with Microscopic reflex to Culture    Specimen: Urine, Clean Catch   Result Value Ref Range    Color Urine Yellow Colorless, Straw, Light Yellow, Yellow    Appearance Urine Cloudy (A) Clear    " Glucose Urine Negative Negative mg/dL    Bilirubin Urine Negative Negative    Ketones Urine Negative Negative mg/dL    Specific Gravity Urine 1.024 1.003 - 1.035    Blood Urine Negative Negative    pH Urine 7.0 5.0 - 7.0    Protein Albumin Urine 30 (A) Negative mg/dL    Urobilinogen Urine Normal Normal, 2.0 mg/dL    Nitrite Urine Negative Negative    Leukocyte Esterase Urine Trace (A) Negative    Mucus Urine Present (A) None Seen /LPF    Amorphous Crystals Urine Few (A) None Seen /HPF    RBC Urine 0 <=2 /HPF    WBC Urine 3 <=5 /HPF    Squamous Epithelials Urine 3 (H) <=1 /HPF    Narrative    Urine Culture not indicated   HCG qualitative urine   Result Value Ref Range    hCG Urine Qualitative Negative Negative   CBC with Platelets & Differential    Narrative    The following orders were created for panel order CBC with Platelets & Differential.  Procedure                               Abnormality         Status                     ---------                               -----------         ------                     CBC with platelets and d...[[id number]]  Abnormal            Final result                 Please view results for these tests on the individual orders.   Comprehensive metabolic panel   Result Value Ref Range    Sodium 137 135 - 145 mmol/L    Potassium 4.2 3.4 - 5.3 mmol/L    Carbon Dioxide (CO2) 20 (L) 22 - 29 mmol/L    Anion Gap 12 7 - 15 mmol/L    Urea Nitrogen 15.1 6.0 - 20.0 mg/dL    Creatinine 0.66 0.51 - 0.95 mg/dL    GFR Estimate >90 >60 mL/min/1.73m2    Calcium 9.3 8.6 - 10.0 mg/dL    Chloride 105 98 - 107 mmol/L    Glucose 143 (H) 70 - 99 mg/dL    Alkaline Phosphatase 60 40 - 150 U/L    AST 13 0 - 45 U/L    ALT 15 0 - 50 U/L    Protein Total 7.6 6.4 - 8.3 g/dL    Albumin 4.5 3.5 - 5.2 g/dL    Bilirubin Total 0.2 <=1.2 mg/dL   [place] [facility]    Narrative    The following orders were created for panel order [place] [facility].  Procedure                               Abnormality         Status                      ---------                               -----------         ------                     Extra Red Top Tube[[id number]]                               Final result               Extra Green Top (Lithium...[[id number]]                      Final result               Extra Purple Top Tube[[id number]]                                                         Please view results for these tests on the individual orders.   CBC with platelets and differential   Result Value Ref Range    WBC Count 7.4 4.0 - 11.0 10e3/uL    RBC Count 4.70 3.80 - 5.20 10e6/uL    Hemoglobin 14.5 11.7 - 15.7 g/dL    Hematocrit 42.6 35.0 - 47.0 %    MCV 91 78 - 100 fL    MCH 30.9 26.5 - 33.0 pg    MCHC 34.0 31.5 - 36.5 g/dL    RDW 11.8 10.0 - 15.0 %    Platelet Count 379 150 - 450 10e3/uL    % Neutrophils 86 %    % Lymphocytes 9 %    % Monocytes 5 %    % Eosinophils 0 %    % Basophils 0 %    % Immature Granulocytes 0 %    NRBCs per 100 WBC 0 <1 /100    Absolute Neutrophils 6.3 1.6 - 8.3 10e3/uL    Absolute Lymphocytes 0.7 (L) 0.8 - 5.3 10e3/uL    Absolute Monocytes 0.4 0.0 - 1.3 10e3/uL    Absolute Eosinophils 0.0 0.0 - 0.7 10e3/uL    Absolute Basophils 0.0 0.0 - 0.2 10e3/uL    Absolute Immature Granulocytes 0.0 <=0.4 10e3/uL    Absolute NRBCs 0.0 10e3/uL   Extra Red Top Tube   Result Value Ref Range    Hold Specimen JI    Extra Green Top (Lithium Heparin) Tube   Result Value Ref Range    Hold Specimen JI    CT Abdomen Pelvis w Contrast    Narrative    EXAM: CT ABDOMEN PELVIS W CONTRAST  LOCATION: [facility]  DATE: 11/20/2023    INDICATION: Left sided abdominal pain, decreased urination (normal renal function today)  COMPARISON: CT abdomen/pelvis with contrast 11/14/2023  TECHNIQUE: CT scan of the abdomen and pelvis was performed following injection of IV contrast. Multiplanar reformats were obtained. Dose reduction techniques were used.  CONTRAST: 77 ml Isovue 370    FINDINGS:   LOWER CHEST: Similar appearance of  left lower lobe cyst.    HEPATOBILIARY: Stable benign right hepatic lobe hemangioma. Steatosis. Unremarkable gallbladder.    PANCREAS: Normal.    SPLEEN: Normal.    ADRENAL GLANDS: Normal.    KIDNEYS/BLADDER: No hydronephrosis. Unremarkable urinary bladder.    BOWEL: No bowel obstruction.    LYMPH NODES: Normal.    VASCULATURE: Unremarkable.    PELVIC ORGANS: Normal.    MUSCULOSKELETAL: Tiny fat-containing umbilical hernia. No acute osseous abnormality.      Impression    IMPRESSION:   1.  Hepatic steatosis.  2.  No ureterolithiasis or hydronephrosis.  3.  No acute process within the abdomen or pelvis.       Medications   promethazine (PHENERGAN) 12.5 mg in sodium chloride 0.9 % 55 mL intermittent infusion (12.5 mg Intravenous $Given 11/20/23 2336)   HYDROmorphone (PF) (DILAUDID) injection 0.5 mg (0.5 mg Intravenous $Given 11/20/23 2336)   sodium chloride 0.9% BOLUS 1,000 mL (0 mLs Intravenous Stopped 11/20/23 2346)   iopamidol (ISOVUE-370) solution 77 mL (77 mLs Intravenous $Given 11/20/23 2319)   sodium chloride 0.9 % bag 500mL for CT scan flush use (59 mLs As instructed $Given 11/20/23 2319)   12:38 AM  I reviewed results in the room with the patient and her .  We discussed all lab diagnostics we also discussed the radiologist interpretation of her CT scan and we reviewed CT scan images on the computer in the room at her request.  The cause of her abdominal pain is uncertain at this point however imaging and lab diagnostics are reassuring that there is no medically emergent or surgically emergent explanation at the present time.  I advised her simple pain medications at baseline and she may use the oxycodone prescribed for breakthrough pain sparingly and in the short-term only.  Prescription for Phenergan which was helpful in the ED for nausea/vomiting.  I like her to follow-up in clinic further with her primary care provider to discuss further diagnostic workup.    Assessments & Plan (with Medical  Decision Making)   Assessment and plan with medical decision making at the time stamp above.    Disclaimer: This note consists of symbols derived from keyboarding, dictation and/or voice recognition software. As a result, there may be errors in the script that have gone undetected. Please consider this when interpreting information found in this chart.      I have reviewed the nursing notes.    I have reviewed the findings, diagnosis, plan and need for follow up with the patient.        New Prescriptions    OXYCODONE (ROXICODONE) 5 MG TABLET    Take 1 tablet (5 mg) by mouth every 6 hours as needed for severe pain    PROMETHAZINE (PHENERGAN) 25 MG TABLET    Take 1 tablet (25 mg) by mouth every 6 hours as needed for nausea or vomiting       Final diagnoses:   Abdominal pain of unknown etiology       11/20/2023   [facility] EMERGENCY DEPT       Scott Hinds MD  12/04/23 3745

## 2024-02-10 ENCOUNTER — HOSPITAL ENCOUNTER (EMERGENCY)
Facility: CLINIC | Age: 39
Discharge: HOME | End: 2024-02-10
Attending: FAMILY MEDICINE | Admitting: FAMILY MEDICINE
Payer: COMMERCIAL

## 2024-02-10 VITALS
DIASTOLIC BLOOD PRESSURE: 78 MMHG | HEART RATE: 107 BPM | SYSTOLIC BLOOD PRESSURE: 146 MMHG | OXYGEN SATURATION: 97 % | TEMPERATURE: 97.4 F | RESPIRATION RATE: 18 BRPM

## 2024-02-10 DIAGNOSIS — M25.522 LEFT ELBOW PAIN: ICD-10-CM

## 2024-02-10 PROCEDURE — 99283 EMERGENCY DEPT VISIT LOW MDM: CPT | Performed by: FAMILY MEDICINE

## 2024-02-10 PROCEDURE — 96374 THER/PROPH/DIAG INJ IV PUSH: CPT | Performed by: FAMILY MEDICINE

## 2024-02-10 PROCEDURE — 250N000011 HC RX IP 250 OP 636: Performed by: FAMILY MEDICINE

## 2024-02-10 PROCEDURE — 99284 EMERGENCY DEPT VISIT MOD MDM: CPT | Mod: 25 | Performed by: FAMILY MEDICINE

## 2024-02-10 RX ORDER — ONDANSETRON 2 MG/ML
4 INJECTION INTRAMUSCULAR; INTRAVENOUS ONCE
Status: DISCONTINUED | OUTPATIENT
Start: 2024-02-10 | End: 2024-02-10 | Stop reason: HOSPADM

## 2024-02-10 RX ORDER — HYDROMORPHONE HYDROCHLORIDE 2 MG/1
2 TABLET ORAL EVERY 6 HOURS PRN
Qty: 10 TABLET | Refills: 0 | Status: SHIPPED | OUTPATIENT
Start: 2024-02-10 | End: 2024-02-13

## 2024-02-10 RX ADMIN — HYDROMORPHONE HYDROCHLORIDE 1 MG: 1 INJECTION, SOLUTION INTRAMUSCULAR; INTRAVENOUS; SUBCUTANEOUS at 19:42

## 2024-02-10 ASSESSMENT — ACTIVITIES OF DAILY LIVING (ADL)
ADLS_ACUITY_SCORE: 35
ADLS_ACUITY_SCORE: 33

## 2024-02-11 NOTE — DISCHARGE INSTRUCTIONS
With the increased level of pain you are experiencing please hold off on using the oxycodone and instead use the prescribed Dilaudid which was more effective for you in the emergency department.  Dilaudid 1 tablet every 4-6 hours as needed to take the edge off your pain that does not respond to other interventions already tried.  Please contact your orthopedic provider and arrange for close follow-up in the near future.  Return to the emergency department if worse or changes.

## 2024-02-11 NOTE — ED TRIAGE NOTES
Patient has elbow swelling and pain. Has multiple issues and hx of surgery. Had steroid injection yesterday with no relief. Has oxycodone 5 mg at 1400 with no relief.      Triage Assessment (Adult)       Row Name 02/10/24 1829          Triage Assessment    Airway WDL WDL        Respiratory WDL    Respiratory WDL WDL        Skin Circulation/Temperature WDL    Skin Circulation/Temperature WDL WDL        Cardiac WDL    Cardiac WDL WDL        Peripheral/Neurovascular WDL    Peripheral Neurovascular WDL WDL        Cognitive/Neuro/Behavioral WDL    Cognitive/Neuro/Behavioral WDL WDL

## 2024-02-11 NOTE — ED PROVIDER NOTES
History     Chief Complaint   Patient presents with    Arm Pain     HPI  Andreina Moeller is a 38 year old female, past medical history is significant for Wegener's granulomatosis without renal involvement, Sjogren's syndrome, panic attacks, episodic mood disorder, generalized anxiety disorder, ADHD, tobacco use disorder, bipolar illness (patient reported), presents the emergency department with concerns of ongoing right arm pain centered about the elbow.  Per the patient this has been ongoing for months.  She is status post bilateral carpal tunnel surgeries and was into see orthopedics at [facility] in [place] yesterday where she was evaluated by Dr. Mary Espinal and received a steroid injection by her account in the right elbow area.  She reports that with the last week with pain steadily increasing the right elbow she has been unable to function with increasing pain paresthesias involving her right fourth and fifth digits primarily but also a little bit of the first digit as well.  This has been present for months.  Worse over the last week prior to the corticosteroid injection.  She feels that it is much worse since the injection yesterday and when she called [facility] back because it was getting worse and going up her arm she was instructed to go to the emergency department rather than go back to [facility].  The patient states she was at work today and was sent home from work because it was hard for her to function as a .  She has been using oxycodone 5 mg but per her account was advised to take 2 tablets every 4-6 hours per primary care provider (I note a refill of oxycodone 10 mg in the EHR for today from her primary care provider Dr. Liset Gilbert).  The patient states that the oxycodone is simply not helping her and she needs something stronger.  She emphasizes that she is unable to take gabapentin or it sends her into a manic episode due to her bipolar illness.      Allergies:  Allergies   Allergen  "Reactions    Gabapentin Other (See Comments)     Patient \"goes into manic episode\"    Hydrocodone Other (See Comments)     Itch  Tolerates percocet    Hydrocodone-Acetaminophen Itching    Methotrexate Muscle Pain (Myalgia) and Other (See Comments)     Nausea, headache  Nausea, headache      Prednisone Nausea and Vomiting       Problem List:    Patient Active Problem List    Diagnosis Date Noted    Wegener's granulomatosis without renal involvement (H) 11/20/2023     Priority: Medium    Sjogren syndrome with keratoconjunctivitis (H24) 11/20/2023     Priority: Medium    Vasculitis (H24) 08/08/2023     Priority: Medium    Panic attack 05/30/2023     Priority: Medium    Arcuate uterus 09/21/2022     Priority: Medium     Formatting of this note might be different from the original. Seen on [facility]      Carpal tunnel syndrome of right wrist 09/08/2022     Priority: Medium    Episodic mood disorder (H24) 01/02/2020     Priority: Medium    Generalized anxiety disorder 03/20/2019     Priority: Medium    Attention deficit hyperactivity disorder (ADHD), predominantly inattentive type 10/26/2018     Priority: Medium     Formatting of this note might be different from the original. Started on Adderall in [place].      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 Orbital Biopsy + with Dr. Barreto June 2011 S/p removal          Past Medical History:    Past Medical History:   Diagnosis Date    Wegener's granulomatosis (granulomatosis with polyangiitis)        Past Surgical History:    Past Surgical History:   Procedure Laterality Date    lacrimal gland biopsy  2010       Family History:    Family History   Problem Relation Age of Onset    Glaucoma Maternal Grandmother     Retinal detachment Maternal Grandmother     Macular " Degeneration Maternal Grandfather     Glaucoma Maternal Grandfather     Diabetes Maternal Grandfather     Diabetes Mother     Hypertension Father     Hypertension Paternal Grandfather        Social History:  Marital Status:  Single [1]  Social History     Tobacco Use    Smoking status: Every Day    Smokeless tobacco: Never        Medications:    HYDROmorphone (DILAUDID) 2 MG tablet  amphetamine-dextroamphetamine (ADDERALL XR) 30 MG 24 hr capsule  azaTHIOprine 100 MG TABS  LORazepam (ATIVAN) 1 MG tablet  oxyCODONE (ROXICODONE) 5 MG tablet  promethazine (PHENERGAN) 25 MG tablet  RABEprazole (ACIPHEX) 20 MG EC tablet          Review of Systems   All other systems reviewed and are negative.      Physical Exam   BP: (!) 146/78  Pulse: 107  Temp: 97.4  F (36.3  C)  Resp: 18  SpO2: 97 %      Physical Exam  Vitals and nursing note reviewed.   Constitutional:       Appearance: Normal appearance.      Comments: The patient appears alert, comfortable, she does not appear to be in pain but states that she has severe greater than 10/10 pain in the elbow.   HENT:      Head: Normocephalic and atraumatic.      Right Ear: Tympanic membrane normal.      Left Ear: Tympanic membrane normal.      Nose: Nose normal.      Mouth/Throat:      Mouth: Mucous membranes are dry.      Pharynx: Oropharynx is clear.   Eyes:      Extraocular Movements: Extraocular movements intact.      Conjunctiva/sclera: Conjunctivae normal.      Pupils: Pupils are equal, round, and reactive to light.   Cardiovascular:      Rate and Rhythm: Normal rate and regular rhythm.   Musculoskeletal:      Cervical back: Normal range of motion and neck supple.      Comments: No asymmetry between the upper extremities.  There is no appreciable swelling or redness about the right elbow and the patient is able to flex and extend without difficulty.  She is tender to palpation along the triceps posteriorly about the left elbow especially medially about the cubital tunnel area  "and into the medial flexor portion of the forearm.  There is no epitrochlear lymphadenopathy.  There is no redness no warmth no erythema and the joint moves freely.  Intact neurovascular status in the digit tips.   Neurological:      Mental Status: She is alert.         ED Course                 Procedures  9:00 PM  Recheck on the patient at this time.  She appears more comfortable.  Definitely \"took the edge off the pain\".  She feels comfortable with plan for disposition to home.  I will give her a small prescription for Dilaudid to use in place of other narcotic pain medication and I made that very clear.  She should not return to the emergency department for narcotic medication refills.  She should follow-up with orthopedics as planned.  She is aware that she is going to require cubital tunnel surgery i.e. release and needs to follow-up with orthopedics.  She is welcome to return at any time if she has new or concerning symptoms or worsening pain that is not controllable at home.         No results found for this or any previous visit (from the past 24 hour(s)).    Medications   ondansetron (ZOFRAN) injection 4 mg (has no administration in time range)   HYDROmorphone (DILAUDID) injection 1 mg (1 mg Intravenous $Given 2/10/24 1942)       Assessments & Plan (with Medical Decision Making)   Assessments and plan with medical decision making at the time stamp above.      Disclaimer: This note consists of symbols derived from keyboarding, dictation and/or voice recognition software. As a result, there may be errors in the script that have gone undetected. Please consider this when interpreting information found in this chart.      I have reviewed the nursing notes.    I have reviewed the findings, diagnosis, plan and need for follow up with the patient.        New Prescriptions    HYDROMORPHONE (DILAUDID) 2 MG TABLET    Take 1 tablet (2 mg) by mouth every 6 hours as needed for pain       Final diagnoses:   Left elbow " pain - Cubital tunnel status post steroid injection       2/10/2024   [facility] EMERGENCY DEPT       Scott Hinds MD  02/10/24 [id number]

## 2024-02-11 NOTE — ED NOTES
Discharge instructions reviewed in detail.  Pt verbalized understanding.  No further questions or concerns. Pt sent home with hard copy of rx to fill tomorrow.

## 2024-02-13 ENCOUNTER — HOSPITAL ENCOUNTER (EMERGENCY)
Facility: CLINIC | Age: 39
Discharge: HOME | End: 2024-02-13
Attending: EMERGENCY MEDICINE | Admitting: EMERGENCY MEDICINE
Payer: COMMERCIAL

## 2024-02-13 VITALS
RESPIRATION RATE: 16 BRPM | DIASTOLIC BLOOD PRESSURE: 76 MMHG | OXYGEN SATURATION: 97 % | HEIGHT: 65 IN | TEMPERATURE: 98.5 F | BODY MASS INDEX: 25.71 KG/M2 | SYSTOLIC BLOOD PRESSURE: 125 MMHG | WEIGHT: 154.32 LBS | HEART RATE: 74 BPM

## 2024-02-13 DIAGNOSIS — M25.521 RIGHT ELBOW PAIN: ICD-10-CM

## 2024-02-13 PROCEDURE — 99284 EMERGENCY DEPT VISIT MOD MDM: CPT | Performed by: EMERGENCY MEDICINE

## 2024-02-13 PROCEDURE — 250N000013 HC RX MED GY IP 250 OP 250 PS 637: Performed by: EMERGENCY MEDICINE

## 2024-02-13 PROCEDURE — 99283 EMERGENCY DEPT VISIT LOW MDM: CPT | Performed by: EMERGENCY MEDICINE

## 2024-02-13 RX ORDER — HYDROMORPHONE HYDROCHLORIDE 2 MG/1
2 TABLET ORAL ONCE
Status: COMPLETED | OUTPATIENT
Start: 2024-02-13 | End: 2024-02-13

## 2024-02-13 RX ADMIN — HYDROMORPHONE HYDROCHLORIDE 2 MG: 2 TABLET ORAL at 23:39

## 2024-02-13 ASSESSMENT — ENCOUNTER SYMPTOMS
CARDIOVASCULAR NEGATIVE: 1
PSYCHIATRIC NEGATIVE: 1
ALLERGIC/IMMUNOLOGIC NEGATIVE: 1
GASTROINTESTINAL NEGATIVE: 1
MUSCULOSKELETAL NEGATIVE: 1
ENDOCRINE NEGATIVE: 1
RESPIRATORY NEGATIVE: 1
NEUROLOGICAL NEGATIVE: 1
HEMATOLOGIC/LYMPHATIC NEGATIVE: 1

## 2024-02-13 ASSESSMENT — ACTIVITIES OF DAILY LIVING (ADL): ADLS_ACUITY_SCORE: 33

## 2024-02-14 NOTE — ED PROVIDER NOTES
"  History     Chief Complaint   Patient presents with    Medication Refill     HPI  Andreina Moeller is a 38 year old female who presents for evaluation with request for medication refill.    Patient was evaluated for left elbow pain 3 days prior and discharged with Dilaudid with a working diagnosis of cubital tunnel status post steroid injection.    Medical records show history of Wegener's granulomatosis, ANCA associated vasculiti pseudotumors,, Sjogren's tobacco use disorder anxiety disorder.    Patient's current prescribed medications were reviewed .    On examination patient reports after evaluation 3 days ago when she was treated for cubital syndrome with hydromorphone she was unable to fill her prescription due to challenges with her medical insurance.  She also reports she obtained a different prescription from her clinic provider yesterday for 21 tablets and was still not able to fill the prescription due to needing prior authorization.  She confirmed that she is on oxycodone 5 mg up to 4 times a day as needed for chronic pain related to her vasculitis, Sjogren's and Wegener's granulomatosis.  She works as a .  She reports she was eval by an orthopedic provider who recommended hand therapy for 6 weeks. Patient reports she is scheduled to see Dr. Randle tomorrow for second opinion.  She reports she has had to be out of work for 2 weeks due to her pain needs.  She reported that she was not having much relief with her home oxycodone-which she currently takes 5 mg up to 4 times a day as needed.  Due to inadequate pain control she arrived alone by car for further assessment and care    Allergies:  Allergies   Allergen Reactions    Gabapentin Other (See Comments)     Patient \"goes into manic episode\"    Hydrocodone Other (See Comments)     Itch  Tolerates percocet    Hydrocodone-Acetaminophen Itching    Methotrexate Muscle Pain (Myalgia) and Other (See Comments)     Nausea, headache  Nausea, " headache      Prednisone Nausea and Vomiting       Problem List:    Patient Active Problem List    Diagnosis Date Noted    Wegener's granulomatosis without renal involvement (H) 11/20/2023     Priority: Medium    Sjogren syndrome with keratoconjunctivitis (H24) 11/20/2023     Priority: Medium    Vasculitis (H24) 08/08/2023     Priority: Medium    Panic attack 05/30/2023     Priority: Medium    Arcuate uterus 09/21/2022     Priority: Medium     Formatting of this note might be different from the original. Seen on [facility]      Carpal tunnel syndrome of right wrist 09/08/2022     Priority: Medium    Episodic mood disorder (H24) 01/02/2020     Priority: Medium    Generalized anxiety disorder 03/20/2019     Priority: Medium    Attention deficit hyperactivity disorder (ADHD), predominantly inattentive type 10/26/2018     Priority: Medium     Formatting of this note might be different from the original. Started on Adderall in [place].      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 Orbital Biopsy + with Dr. Barreto June 2011 S/p removal          Past Medical History:    Past Medical History:   Diagnosis Date    Wegener's granulomatosis (granulomatosis with polyangiitis)        Past Surgical History:    Past Surgical History:   Procedure Laterality Date    lacrimal gland biopsy  2010       Family History:    Family History   Problem Relation Age of Onset    Glaucoma Maternal Grandmother     Retinal detachment Maternal Grandmother     Macular Degeneration Maternal Grandfather     Glaucoma Maternal Grandfather     Diabetes Maternal Grandfather     Diabetes Mother     Hypertension Father     Hypertension Paternal Grandfather        Social History:  Marital Status:  Single [1]  Social History     Tobacco Use    Smoking  "status: Every Day    Smokeless tobacco: Never        Medications:    amphetamine-dextroamphetamine (ADDERALL XR) 30 MG 24 hr capsule  azaTHIOprine 100 MG TABS  HYDROmorphone (DILAUDID) 2 MG tablet  LORazepam (ATIVAN) 1 MG tablet  oxyCODONE (ROXICODONE) 5 MG tablet  promethazine (PHENERGAN) 25 MG tablet  RABEprazole (ACIPHEX) 20 MG EC tablet          Review of Systems   Constitutional:         Right elbow   Respiratory: Negative.     Cardiovascular: Negative.    Gastrointestinal: Negative.    Endocrine: Negative.    Genitourinary: Negative.    Musculoskeletal: Negative.    Allergic/Immunologic: Negative.    Neurological: Negative.    Hematological: Negative.    Psychiatric/Behavioral: Negative.     All other systems reviewed and are negative.      Physical Exam   BP: 125/76  Pulse: 74  Temp: 98.5  F (36.9  C)  Resp: 16  Height: 165.1 cm (5' 5\")  Weight: 70 kg (154 lb 5.2 oz)  SpO2: 97 %      Physical Exam  Constitutional:       Appearance: She is not ill-appearing or diaphoretic.   HENT:      Head: Normocephalic and atraumatic.      Nose: Nose normal.   Eyes:      Extraocular Movements: Extraocular movements intact.      Pupils: Pupils are equal, round, and reactive to light.   Cardiovascular:      Rate and Rhythm: Normal rate and regular rhythm.   Musculoskeletal:         General: Tenderness present.      Cervical back: Normal range of motion and neck supple.   Skin:     Capillary Refill: Capillary refill takes less than 2 seconds.   Neurological:      General: No focal deficit present.      Mental Status: She is alert and oriented to person, place, and time.      Cranial Nerves: No cranial nerve deficit.      Sensory: No sensory deficit.      Motor: No weakness.      Coordination: Coordination normal.      Gait: Gait normal.      Deep Tendon Reflexes: Reflexes normal.   Psychiatric:         Mood and Affect: Mood normal.         Behavior: Behavior normal.         Thought Content: Thought content normal.         " "Judgment: Judgment normal.         ED Course                 Procedures              Critical Care time:  none             ED medications:  Medications   HYDROmorphone (DILAUDID) tablet 2 mg (has no administration in time range)      ED vitals:  Vitals:    02/13/24 1947   BP: 125/76   Pulse: 74   Resp: 16   Temp: 98.5  F (36.9  C)   TempSrc: Oral   SpO2: 97%   Weight: 70 kg (154 lb 5.2 oz)   Height: 1.651 m (5' 5\")     ED labs and imaging :none      Assessments & Plan (with Medical Decision Making)   Assessment Summary and Clinical Impression: 38-year-old female right hand dominant  who presented with request for medication refill.  Patient was evaluated 3 days earlier with left elbow pain and discharged home with hydromorphone.  Patient contacted her primary care provider for medication refill and reported that medical insurance requires prior authorization.  She reports planning second opinion for right cubital tunnel syndrome due to the initial evaluation by orthopedic  provider recommending hand therapy for 6 weeks.  Patient reported she is scheduled for second opinion the next day with Dr. DENNY Acosta. On intake she was afebrile blood pressure was 124/ 76 97% on room air.  Patient reported that after visit 3 days ago medical insurance would not cover hydromorphone prescribed due to need for prior authorization. Patient stated minimal relief with oxycodone-taking up to 4 times a day for pain related to her other medical diagnoses. Patient requested IV Dilaudid. I informed the patient I was not  comfortable giving her IV Dilaudid as requested for acute on chronic pain. Patient was offered oral Dilaudid.  She was encouraged to follow-up with her care team for further medication management needs.    ED course and plan:  Reviewed the medical record.  Reviewed visit on 2/10/24.  Patient received 10 tablets of hydromorphone 3 days earlier.  Patient request IV Dilaudid for pain management.  I was not comfortable " prescribing IV hydromorphone for acute on chronic pain.  She was offered oral Dilaudid.  I encouraged her to keep her upcoming visit with [facility]- Dr JESSICA Acosta in the morning as reported for further care.  We also reviewed the importance of medication management needs with her prescribing and/or clinic provider.  She expressed understanding and agreement but had displeasure with the treatment plan.      Disclaimer: This note consists of symbols derived from keyboarding, dictation and/or voice recognition software. As a result, there may be errors in the script that have gone undetected. Please consider this when interpreting information found in this chart.   I have reviewed the nursing notes.    I have reviewed the findings, diagnosis, plan and need for follow up with the patient.           Medical Decision Making  The patient's presentation was of moderate complexity (chronic pan).    The patient's evaluation involved:  history and exam without other MDM data elements    The patient's management necessitated moderate risk (prescription drug management including medications given in the ED).        New Prescriptions    No medications on file       Final diagnoses:   Right elbow pain - Acute on chronic.  Recent evaluation for cubital tunnel syndrome       2/13/2024   [facility] EMERGENCY DEPT       Julian Ta MD  02/14/24 0054

## 2024-02-14 NOTE — ED TRIAGE NOTES
Pt is hoping to get a refill out of instymed machine for her dilaudid prescription. Was seen Saturday with Dr Hinds and has been unable to get refill on ordered prescription [facility]. Pt is having uncontrolled pain. Pt has followed up with PCP and [facility]. Pt goes to see [facility] in the AM, but is hoping for some pain control until then.      Triage Assessment (Adult)       Row Name 02/13/24 1948          Triage Assessment    Airway WDL WDL        Respiratory WDL    Respiratory WDL WDL        Skin Circulation/Temperature WDL    Skin Circulation/Temperature WDL WDL        Cardiac WDL    Cardiac WDL WDL        Peripheral/Neurovascular WDL    Peripheral Neurovascular WDL WDL        Cognitive/Neuro/Behavioral WDL    Cognitive/Neuro/Behavioral WDL WDL

## 2024-02-14 NOTE — DISCHARGE INSTRUCTIONS
1) Best wishes with your follow-up consultation with [facility] for second opinion.    2) be sure to reach out to your prescribing and/or clinic provider for further medication needs.

## 2024-06-22 ENCOUNTER — HEALTH MAINTENANCE LETTER (OUTPATIENT)
Age: 39
End: 2024-06-22

## 2024-11-02 ENCOUNTER — APPOINTMENT (OUTPATIENT)
Dept: GENERAL RADIOLOGY | Facility: CLINIC | Age: 39
End: 2024-11-02
Attending: EMERGENCY MEDICINE
Payer: COMMERCIAL

## 2024-11-02 ENCOUNTER — HOSPITAL ENCOUNTER (EMERGENCY)
Facility: CLINIC | Age: 39
Discharge: HOME | End: 2024-11-02
Attending: EMERGENCY MEDICINE | Admitting: EMERGENCY MEDICINE
Payer: COMMERCIAL

## 2024-11-02 VITALS
OXYGEN SATURATION: 97 % | SYSTOLIC BLOOD PRESSURE: 136 MMHG | TEMPERATURE: 98.3 F | DIASTOLIC BLOOD PRESSURE: 79 MMHG | RESPIRATION RATE: 18 BRPM | HEART RATE: 91 BPM

## 2024-11-02 DIAGNOSIS — M25.572 ACUTE LEFT ANKLE PAIN: ICD-10-CM

## 2024-11-02 LAB
ANION GAP SERPL CALCULATED.3IONS-SCNC: 13 MMOL/L (ref 7–15)
BASOPHILS # BLD AUTO: 0 10E3/UL (ref 0–0.2)
BASOPHILS NFR BLD AUTO: 1 %
BUN SERPL-MCNC: 12.3 MG/DL (ref 6–20)
CALCIUM SERPL-MCNC: 9.8 MG/DL (ref 8.8–10.4)
CHLORIDE SERPL-SCNC: 103 MMOL/L (ref 98–107)
CREAT SERPL-MCNC: 0.76 MG/DL (ref 0.51–0.95)
EGFRCR SERPLBLD CKD-EPI 2021: >90 ML/MIN/1.73M2
EOSINOPHIL # BLD AUTO: 0.1 10E3/UL (ref 0–0.7)
EOSINOPHIL NFR BLD AUTO: 1 %
ERYTHROCYTE [DISTWIDTH] IN BLOOD BY AUTOMATED COUNT: 11.9 % (ref 10–15)
GLUCOSE SERPL-MCNC: 96 MG/DL (ref 70–99)
HCO3 SERPL-SCNC: 23 MMOL/L (ref 22–29)
HCT VFR BLD AUTO: 42.5 % (ref 35–47)
HGB BLD-MCNC: 14.6 G/DL (ref 11.7–15.7)
HOLD SPECIMEN: NORMAL
HOLD SPECIMEN: NORMAL
IMM GRANULOCYTES # BLD: 0 10E3/UL
IMM GRANULOCYTES NFR BLD: 0 %
LYMPHOCYTES # BLD AUTO: 1.7 10E3/UL (ref 0.8–5.3)
LYMPHOCYTES NFR BLD AUTO: 33 %
MCH RBC QN AUTO: 31.6 PG (ref 26.5–33)
MCHC RBC AUTO-ENTMCNC: 34.4 G/DL (ref 31.5–36.5)
MCV RBC AUTO: 92 FL (ref 78–100)
MONOCYTES # BLD AUTO: 0.5 10E3/UL (ref 0–1.3)
MONOCYTES NFR BLD AUTO: 10 %
NEUTROPHILS # BLD AUTO: 2.7 10E3/UL (ref 1.6–8.3)
NEUTROPHILS NFR BLD AUTO: 54 %
NRBC # BLD AUTO: 0 10E3/UL
NRBC BLD AUTO-RTO: 0 /100
PLATELET # BLD AUTO: 311 10E3/UL (ref 150–450)
POTASSIUM SERPL-SCNC: 4.2 MMOL/L (ref 3.4–5.3)
RBC # BLD AUTO: 4.62 10E6/UL (ref 3.8–5.2)
SODIUM SERPL-SCNC: 139 MMOL/L (ref 135–145)
WBC # BLD AUTO: 5 10E3/UL (ref 4–11)

## 2024-11-02 PROCEDURE — 99284 EMERGENCY DEPT VISIT MOD MDM: CPT | Performed by: EMERGENCY MEDICINE

## 2024-11-02 PROCEDURE — 250N000011 HC RX IP 250 OP 636: Performed by: EMERGENCY MEDICINE

## 2024-11-02 PROCEDURE — 73610 X-RAY EXAM OF ANKLE: CPT | Mod: LT

## 2024-11-02 PROCEDURE — 99284 EMERGENCY DEPT VISIT MOD MDM: CPT | Mod: 25 | Performed by: EMERGENCY MEDICINE

## 2024-11-02 PROCEDURE — 36415 COLL VENOUS BLD VENIPUNCTURE: CPT | Performed by: EMERGENCY MEDICINE

## 2024-11-02 PROCEDURE — 85025 COMPLETE CBC W/AUTO DIFF WBC: CPT | Performed by: EMERGENCY MEDICINE

## 2024-11-02 PROCEDURE — 96365 THER/PROPH/DIAG IV INF INIT: CPT | Performed by: EMERGENCY MEDICINE

## 2024-11-02 PROCEDURE — 80048 BASIC METABOLIC PNL TOTAL CA: CPT | Performed by: EMERGENCY MEDICINE

## 2024-11-02 RX ORDER — CEFTRIAXONE 1 G/1
1 INJECTION, POWDER, FOR SOLUTION INTRAMUSCULAR; INTRAVENOUS ONCE
Status: COMPLETED | OUTPATIENT
Start: 2024-11-02 | End: 2024-11-02

## 2024-11-02 RX ORDER — HYDROMORPHONE HYDROCHLORIDE 2 MG/1
2 TABLET ORAL EVERY 6 HOURS PRN
Qty: 8 TABLET | Refills: 0 | Status: SHIPPED | OUTPATIENT
Start: 2024-11-02 | End: 2024-11-05

## 2024-11-02 RX ORDER — CEPHALEXIN 500 MG/1
500 CAPSULE ORAL 4 TIMES DAILY
Qty: 12 CAPSULE | Refills: 0 | Status: SHIPPED | OUTPATIENT
Start: 2024-11-02 | End: 2024-11-05

## 2024-11-02 RX ADMIN — CEFTRIAXONE SODIUM 1 G: 1 INJECTION, POWDER, FOR SOLUTION INTRAMUSCULAR; INTRAVENOUS at 17:30

## 2024-11-02 ASSESSMENT — COLUMBIA-SUICIDE SEVERITY RATING SCALE - C-SSRS
2. HAVE YOU ACTUALLY HAD ANY THOUGHTS OF KILLING YOURSELF IN THE PAST MONTH?: NO
1. IN THE PAST MONTH, HAVE YOU WISHED YOU WERE DEAD OR WISHED YOU COULD GO TO SLEEP AND NOT WAKE UP?: NO
6. HAVE YOU EVER DONE ANYTHING, STARTED TO DO ANYTHING, OR PREPARED TO DO ANYTHING TO END YOUR LIFE?: NO

## 2024-11-02 ASSESSMENT — ACTIVITIES OF DAILY LIVING (ADL)
ADLS_ACUITY_SCORE: 0
ADLS_ACUITY_SCORE: 0

## 2024-11-02 NOTE — ED PROVIDER NOTES
"  History     Chief Complaint   Patient presents with    Post-op Problem     HPI  Andreina Moeller is a 39 year old female who presents with concern about her surgical site. Patient underwent a peroneal tendon repair per the patient on the 23rd of last month.  This was done at [facility].  I reviewed some outside notes though the actual operative note is not in the chart.  There is her third surgery on her left ankle.  The bandage has been in place since.  She has been having burning throughout her left foot especially in the first webspace since the surgery.  Over the past 36 hours she feels like the area has gotten swollen and is hot and she thinks that her skin changes.  She marked it.  She came here because her boyfriend lives nearby. She has not spoken with her surgeon about this. She did tell a surgical PA about the burning. Has a history of ANCA associated vasculitis, granulomatosis with polyangiitis, and arcuate uterus, mood disorder, anxiety disorder, panic attack, Sjogren syndrome, tobacco use disorder.         Allergies:  Allergies   Allergen Reactions    Cellcept [Mycophenolate] Nausea and Vomiting    Gabapentin Other (See Comments)     Patient \"goes into manic episode\"    Hydrocodone Other (See Comments)     Itch  Tolerates percocet    Hydrocodone-Acetaminophen Itching    Methotrexate Muscle Pain (Myalgia) and Other (See Comments)     Nausea, headache  Nausea, headache      Prednisone Nausea and Vomiting       Problem List:    Patient Active Problem List    Diagnosis Date Noted    Wegener's granulomatosis without renal involvement (H) 11/20/2023     Priority: Medium    Sjogren syndrome with keratoconjunctivitis (H) 11/20/2023     Priority: Medium    Vasculitis (H) 08/08/2023     Priority: Medium    Panic attack 05/30/2023     Priority: Medium    Arcuate uterus 09/21/2022     Priority: Medium     Formatting of this note might be different from the original. Seen on G      Carpal tunnel " syndrome of right wrist 09/08/2022     Priority: Medium    Episodic mood disorder (H) 01/02/2020     Priority: Medium    Generalized anxiety disorder 03/20/2019     Priority: Medium    Attention deficit hyperactivity disorder (ADHD), predominantly inattentive type 10/26/2018     Priority: Medium     Formatting of this note might be different from the original. Started on Adderall in [place].      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 Orbital Biopsy + with Dr. Barreto June 2011 S/p removal          Past Medical History:    Past Medical History:   Diagnosis Date    Wegener's granulomatosis (granulomatosis with polyangiitis)        Past Surgical History:    Past Surgical History:   Procedure Laterality Date    lacrimal gland biopsy  2010       Family History:    Family History   Problem Relation Age of Onset    Glaucoma Maternal Grandmother     Retinal detachment Maternal Grandmother     Macular Degeneration Maternal Grandfather     Glaucoma Maternal Grandfather     Diabetes Maternal Grandfather     Diabetes Mother     Hypertension Father     Hypertension Paternal Grandfather        Social History:  Marital Status:  Single [1]  Social History     Tobacco Use    Smoking status: Every Day    Smokeless tobacco: Never        Medications:    amphetamine-dextroamphetamine (ADDERALL XR) 30 MG 24 hr capsule  azaTHIOprine 100 MG TABS  cephALEXin (KEFLEX) 500 MG capsule  HYDROmorphone (DILAUDID) 2 MG tablet  LORazepam (ATIVAN) 1 MG tablet  oxyCODONE (ROXICODONE) 5 MG tablet  promethazine (PHENERGAN) 25 MG tablet  RABEprazole (ACIPHEX) 20 MG EC tablet          Review of Systems    Physical Exam   BP: 136/79  Pulse: 91  Temp: 98.3  F (36.8  C)  Resp: 18  SpO2: 97 %      Physical Exam  Constitutional:       General:  She is not in acute distress.     Appearance: She is not toxic-appearing.   HENT:      Head: Normocephalic.      Right Ear: External ear normal.      Left Ear: External ear normal.      Mouth/Throat:      Mouth: Mucous membranes are moist.   Musculoskeletal:      Comments: Left lateral ankle with dressing in place. This was removed in the room and no skin breaks and the wound is clean and intact, there is a some amount of swelling around the area but not a large amount and there is some slight skin changes and erythema around it though there is no streaking, the patient says it is incredibly tender, there is no deformity   Skin:     Capillary Refill: Capillary refill takes less than 2 seconds.   Neurological:      Mental Status: She is oriented to person, place, and time.      Cranial Nerves: No cranial nerve deficit.         ED Course     ED Course as of 11/02/24 1827   Sat Nov 02, 2024   1636 I reviewed PDMP and the patient has a large amount of opioid and benzo prescriptions currently.    1701 No wbc elevation. Less likely infection.    1726 I examined the patient again.  She is very nondistressed in the room.  Despite her insistence that she is having massive pain, when I asked her about how the surgery was done, she excitedly ranges her ankle without difficulty and not complaining of pain when doing so while explaining how it was done.  I do not think this is an infection.  She might have a small cellulitis or perhaps a stitch infection.  I gave her some Keflex.  She really wants pain medication.  She got some Dilaudid after her surgery because she is not allowed to get any more oxycodone.  Getting for small amount of Dilaudid to get her until her appointment with her surgeon on Tuesday.  I recommended that she follow-up with her surgeon about this in the future. I dont' think this is a surgical infection but it should be evaluated by her surgeon.    1736 I told the patient white count was normal and she said  that because of her immunosuppressive medication she often has normal white count and that normal for her is 4-5.  Today it is 5. Doubt infection.  I independently interpreted the x-ray and I do not see any fractures or gas.  There is a small amount of swelling, which is to be expected after surgery.   1816 Rocephin done.  I strongly doubt surgical site infection.  I am going to discharge the patient.  I did discuss opiate precautions and she expressed understanding.  She is not driving.  She did not getting pain medicine here.  She is nontoxic.  Her vital signs are all reassuring.  She is soledad follow-up with her surgeon as planned on Tuesday and will call on Monday and see if she could be seen sooner.  Encouraged elevation at home.  I gave her ER precautions and will discharge her at this time.  She is very appreciative.   1819 XR read as:    IMPRESSION: Mild soft tissue swelling overlying the lateral malleolus. No fracture. Intact ankle mortise and distal syndesmosis.   1827 Redressed the patient's surgical site with nonadherent dressing and Tegaderm and tapped on the ankle to apply the Tegaderm and the patient had no reaction to this. Doubt surgical infection.     Procedures           Lactic acid elevated due to it was not checked but this box pops up in the chart. At this time there are no signs of sepsis or septic shock           Results for orders placed or performed during the hospital encounter of 11/02/24 (from the past 24 hours)   CBC with platelets differential    Narrative    The following orders were created for panel order CBC with platelets differential.  Procedure                               Abnormality         Status                     ---------                               -----------         ------                     CBC with platelets and d...[[id number]]                      Final result                 Please view results for these tests on the individual orders.   Basic metabolic panel    Result Value Ref Range    Sodium 139 135 - 145 mmol/L    Potassium 4.2 3.4 - 5.3 mmol/L    Chloride 103 98 - 107 mmol/L    Carbon Dioxide (CO2) 23 22 - 29 mmol/L    Anion Gap 13 7 - 15 mmol/L    Urea Nitrogen 12.3 6.0 - 20.0 mg/dL    Creatinine 0.76 0.51 - 0.95 mg/dL    GFR Estimate >90 >60 mL/min/1.73m2    Calcium 9.8 8.8 - 10.4 mg/dL    Glucose 96 70 - 99 mg/dL   [place] Draw    Narrative    The following orders were created for panel order [place] Draw.  Procedure                               Abnormality         Status                     ---------                               -----------         ------                     Extra Blue Top Tube[[id number]]                              Final result               Extra Red Top Tube[[id number]]                               Final result                 Please view results for these tests on the individual orders.   CBC with platelets and differential   Result Value Ref Range    WBC Count 5.0 4.0 - 11.0 10e3/uL    RBC Count 4.62 3.80 - 5.20 10e6/uL    Hemoglobin 14.6 11.7 - 15.7 g/dL    Hematocrit 42.5 35.0 - 47.0 %    MCV 92 78 - 100 fL    MCH 31.6 26.5 - 33.0 pg    MCHC 34.4 31.5 - 36.5 g/dL    RDW 11.9 10.0 - 15.0 %    Platelet Count 311 150 - 450 10e3/uL    % Neutrophils 54 %    % Lymphocytes 33 %    % Monocytes 10 %    % Eosinophils 1 %    % Basophils 1 %    % Immature Granulocytes 0 %    NRBCs per 100 WBC 0 <1 /100    Absolute Neutrophils 2.7 1.6 - 8.3 10e3/uL    Absolute Lymphocytes 1.7 0.8 - 5.3 10e3/uL    Absolute Monocytes 0.5 0.0 - 1.3 10e3/uL    Absolute Eosinophils 0.1 0.0 - 0.7 10e3/uL    Absolute Basophils 0.0 0.0 - 0.2 10e3/uL    Absolute Immature Granulocytes 0.0 <=0.4 10e3/uL    Absolute NRBCs 0.0 10e3/uL   Extra Blue Top Tube   Result Value Ref Range    Hold Specimen JIC    Extra Red Top Tube   Result Value Ref Range    Hold Specimen JIC    XR Ankle Left G/E 3 Views    Narrative    EXAM: XR ANKLE LEFT G/E 3 VIEWS  LOCATION: [facility]  [facility]  DATE: 11/2/2024    INDICATION: swelling, peroneal tendon surgery on 10 23  COMPARISON: 03/22/2016      Impression    IMPRESSION: Mild soft tissue swelling overlying the lateral malleolus. No fracture. Intact ankle mortise and distal syndesmosis.       Medications   cefTRIAXone (ROCEPHIN) 1 g vial to attach to  mL bag for ADULTS or NS 50 mL bag for PEDS (0 g Intravenous Stopped 11/2/24 1802)       Assessments & Plan (with Medical Decision Making)     I do not think that this is a wound infection as it is rather far out from the actual surgery.  I am going to get basic labs and an x-ray.  I will give her Rocephin empirically though this may be a cellulitis or a stitch abscess but I doubt that this is an actual surgical wound infection. She would be best served by evaluation by her surgeon.     I have reviewed the nursing notes.    I have reviewed the findings, diagnosis, plan and need for follow up with the patient.        Medical Decision Making  The patient's presentation was of moderate complexity (a chronic illness mild to moderate exacerbation, progression, or side effect of treatment).    The patient's evaluation involved:  review of external note(s) from 2 sources (see separate area of note for details)  ordering and/or review of 3+ test(s) in this encounter (see separate area of note for details)  independent interpretation of testing performed by another health professional (see separate area of note for details)    The patient's management necessitated moderate risk (prescription drug management including medications given in the ED).        New Prescriptions    CEPHALEXIN (KEFLEX) 500 MG CAPSULE    Take 1 capsule (500 mg) by mouth 4 times daily for 3 days.    HYDROMORPHONE (DILAUDID) 2 MG TABLET    Take 1 tablet (2 mg) by mouth every 6 hours as needed for pain.       Final diagnoses:   Acute left ankle pain       11/2/2024   [facility] EMERGENCY DEPT       Enoc Jenkins  MD Luke  11/02/24 1824       Enoc Jenkins MD  11/02/24 1821

## 2024-11-02 NOTE — ED TRIAGE NOTES
Pt had left ankle surgery on 10/23. Started noticing increased bleeding, swelling, and redness today. Concerned for infection.

## 2024-11-02 NOTE — DISCHARGE INSTRUCTIONS
You were seen today for pain in your ankle surgical site.     I don't think you have an infection in your ankle. You may have a small cellulitis or a stitch abscess. Please follow up with your surgeon as planned on Tuesday. If you can get seen on Monday, do that.     Keep it elevated as much as possible.    I prescribed some antibiotics for a possible small cellulitis. I prescribed some dilaudid to tide you over until you are seen on Tuesday.     It is an opiate and do not mix it with alcohol, driving, Tylenol, other sedating medications, drugs, or machinery operation.     It will make you constipated, so please drink plenty of fluids and eat vegetables and fruit and beans when taking it. Avoid meat, which makes you more constipated. I would recommend taking MiraLax (which you can purchase over the counter) as  well as a stool softener like Colace (also over the counter) twice a day when taking it.     Repeated studies have shown that the longer you use opioid pain medications, the longer it is until you return to normal function. It is our recommendation that you taper off opioids as quickly as possible with the goal of returning to normal function or near normal function. Long term use of opioids quickly results in growing tolerance to the medication (less of the benefits) and increased dependence (more of the bad side effects).     Pain is very difficult to treat and we can very rarely take away pain completely. If you are having difficulty with pain over several weeks after an injury, you may need to start different medications and therapies (such as physical therapy, graded exercise, massage, and acupuncture). Please talk about this with your regular doctor.     If you are interested in seeking free, confidential treatment referral and information service for individuals and families facing mental health and/or substance use disorders please call [phone])

## 2025-03-03 ENCOUNTER — HOSPITAL ENCOUNTER (EMERGENCY)
Facility: CLINIC | Age: 40
Discharge: HOME | End: 2025-03-04
Attending: FAMILY MEDICINE | Admitting: FAMILY MEDICINE
Payer: COMMERCIAL

## 2025-03-03 DIAGNOSIS — J18.9 COMMUNITY ACQUIRED PNEUMONIA, UNSPECIFIED LATERALITY: ICD-10-CM

## 2025-03-03 LAB
ANION GAP SERPL CALCULATED.3IONS-SCNC: 10 MMOL/L (ref 7–15)
BASOPHILS # BLD AUTO: 0 10E3/UL (ref 0–0.2)
BASOPHILS NFR BLD AUTO: 0 %
BUN SERPL-MCNC: 11.3 MG/DL (ref 6–20)
CALCIUM SERPL-MCNC: 9.7 MG/DL (ref 8.8–10.4)
CHLORIDE SERPL-SCNC: 105 MMOL/L (ref 98–107)
CREAT SERPL-MCNC: 0.68 MG/DL (ref 0.51–0.95)
EGFRCR SERPLBLD CKD-EPI 2021: >90 ML/MIN/1.73M2
EOSINOPHIL # BLD AUTO: 0 10E3/UL (ref 0–0.7)
EOSINOPHIL NFR BLD AUTO: 0 %
ERYTHROCYTE [DISTWIDTH] IN BLOOD BY AUTOMATED COUNT: 11.9 % (ref 10–15)
FLUAV RNA SPEC QL NAA+PROBE: NEGATIVE
FLUBV RNA RESP QL NAA+PROBE: NEGATIVE
GLUCOSE SERPL-MCNC: 114 MG/DL (ref 70–99)
HCO3 SERPL-SCNC: 26 MMOL/L (ref 22–29)
HCT VFR BLD AUTO: 43.8 % (ref 35–47)
HGB BLD-MCNC: 14.7 G/DL (ref 11.7–15.7)
IMM GRANULOCYTES # BLD: 0 10E3/UL
IMM GRANULOCYTES NFR BLD: 0 %
LYMPHOCYTES # BLD AUTO: 0.7 10E3/UL (ref 0.8–5.3)
LYMPHOCYTES NFR BLD AUTO: 14 %
MCH RBC QN AUTO: 30.9 PG (ref 26.5–33)
MCHC RBC AUTO-ENTMCNC: 33.6 G/DL (ref 31.5–36.5)
MCV RBC AUTO: 92 FL (ref 78–100)
MONOCYTES # BLD AUTO: 0.2 10E3/UL (ref 0–1.3)
MONOCYTES NFR BLD AUTO: 5 %
NEUTROPHILS # BLD AUTO: 3.9 10E3/UL (ref 1.6–8.3)
NEUTROPHILS NFR BLD AUTO: 81 %
NRBC # BLD AUTO: 0 10E3/UL
NRBC BLD AUTO-RTO: 0 /100
PLAT MORPH BLD: NORMAL
PLATELET # BLD AUTO: 281 10E3/UL (ref 150–450)
POTASSIUM SERPL-SCNC: 4.3 MMOL/L (ref 3.4–5.3)
RBC # BLD AUTO: 4.75 10E6/UL (ref 3.8–5.2)
RBC MORPH BLD: NORMAL
RSV RNA SPEC NAA+PROBE: NEGATIVE
SARS-COV-2 RNA RESP QL NAA+PROBE: NEGATIVE
SODIUM SERPL-SCNC: 141 MMOL/L (ref 135–145)
WBC # BLD AUTO: 4.8 10E3/UL (ref 4–11)

## 2025-03-03 PROCEDURE — 80048 BASIC METABOLIC PNL TOTAL CA: CPT | Performed by: FAMILY MEDICINE

## 2025-03-03 PROCEDURE — 258N000003 HC RX IP 258 OP 636: Performed by: FAMILY MEDICINE

## 2025-03-03 PROCEDURE — 99283 EMERGENCY DEPT VISIT LOW MDM: CPT | Performed by: FAMILY MEDICINE

## 2025-03-03 PROCEDURE — 87637 SARSCOV2&INF A&B&RSV AMP PRB: CPT | Performed by: FAMILY MEDICINE

## 2025-03-03 PROCEDURE — 250N000013 HC RX MED GY IP 250 OP 250 PS 637: Performed by: FAMILY MEDICINE

## 2025-03-03 PROCEDURE — 85048 AUTOMATED LEUKOCYTE COUNT: CPT | Performed by: FAMILY MEDICINE

## 2025-03-03 PROCEDURE — 85004 AUTOMATED DIFF WBC COUNT: CPT | Performed by: FAMILY MEDICINE

## 2025-03-03 PROCEDURE — 96360 HYDRATION IV INFUSION INIT: CPT | Performed by: FAMILY MEDICINE

## 2025-03-03 PROCEDURE — 36415 COLL VENOUS BLD VENIPUNCTURE: CPT | Performed by: FAMILY MEDICINE

## 2025-03-03 PROCEDURE — 99284 EMERGENCY DEPT VISIT MOD MDM: CPT | Performed by: FAMILY MEDICINE

## 2025-03-03 RX ORDER — ACETAMINOPHEN 500 MG
1000 TABLET ORAL ONCE
Status: COMPLETED | OUTPATIENT
Start: 2025-03-03 | End: 2025-03-03

## 2025-03-03 RX ORDER — ONDANSETRON 2 MG/ML
4 INJECTION INTRAMUSCULAR; INTRAVENOUS
Status: DISCONTINUED | OUTPATIENT
Start: 2025-03-03 | End: 2025-03-04 | Stop reason: HOSPADM

## 2025-03-03 RX ORDER — OXYCODONE HYDROCHLORIDE 5 MG/1
5 TABLET ORAL ONCE
Status: COMPLETED | OUTPATIENT
Start: 2025-03-03 | End: 2025-03-03

## 2025-03-03 RX ADMIN — ACETAMINOPHEN 1000 MG: 500 TABLET, FILM COATED ORAL at 22:54

## 2025-03-03 RX ADMIN — SODIUM CHLORIDE 1000 ML: 0.9 INJECTION, SOLUTION INTRAVENOUS at 23:00

## 2025-03-03 RX ADMIN — OXYCODONE 5 MG: 5 TABLET ORAL at 23:56

## 2025-03-03 ASSESSMENT — COLUMBIA-SUICIDE SEVERITY RATING SCALE - C-SSRS
1. IN THE PAST MONTH, HAVE YOU WISHED YOU WERE DEAD OR WISHED YOU COULD GO TO SLEEP AND NOT WAKE UP?: NO
6. HAVE YOU EVER DONE ANYTHING, STARTED TO DO ANYTHING, OR PREPARED TO DO ANYTHING TO END YOUR LIFE?: NO
2. HAVE YOU ACTUALLY HAD ANY THOUGHTS OF KILLING YOURSELF IN THE PAST MONTH?: NO

## 2025-03-03 ASSESSMENT — ACTIVITIES OF DAILY LIVING (ADL)
ADLS_ACUITY_SCORE: 41
ADLS_ACUITY_SCORE: 41

## 2025-03-04 VITALS
TEMPERATURE: 97.8 F | HEIGHT: 65 IN | SYSTOLIC BLOOD PRESSURE: 96 MMHG | HEART RATE: 72 BPM | OXYGEN SATURATION: 96 % | BODY MASS INDEX: 27.32 KG/M2 | WEIGHT: 164 LBS | RESPIRATION RATE: 22 BRPM | DIASTOLIC BLOOD PRESSURE: 75 MMHG

## 2025-03-04 LAB
HOLD SPECIMEN: NORMAL

## 2025-03-04 NOTE — DISCHARGE INSTRUCTIONS
RETURN TO THE EMERGENCY ROOM FOR THE FOLLOWING:    Severely worsened breathing, concerns for dehydration, or at anytime for any concern.    FOLLOW UP:    With your primary clinic only as needed.    TREATMENT RECOMMENDATIONS:    Tylenol 1000 mg every 6 hours as needed over the next 5 days.    NURSE ADVICE LINE:  [phone] or [phone]

## 2025-03-04 NOTE — ED NOTES
Clear lung sounds uppers. Middle and lower are diminished. Recently started steroids and ABX for pneumonia. Now redness to the skin abpove left eye and between fingers.   There is redness on the right shin. No raised bumps to this redness.

## 2025-03-04 NOTE — ED PROVIDER NOTES
"  HPI   Patient is a 39-year-old female presenting with concern for generalized fatigue and shortness of breath.  She was seen on 2/26 for concern of sinusitis.  She was given Augmentin.  She was seen today and had a chest x-ray that was negative for pneumonia but there was clinical concern and so azithromycin  And prednisone were added.  Augmentin last dose taken today.  Known history of ANCA associated vasculitis, Wegener's granuloma without renal involvement, Sjogren syndrome.  The patient reports increased chest congestion, coughing, sputum with blood, fever, headache and facial pain, myalgia, fatigue, nausea but no vomiting or diarrhea since about a week ago.  She feels dehydrated, poor oral intake.      Allergies:  Allergies   Allergen Reactions    Cellcept [Mycophenolate] Nausea and Vomiting    Gabapentin Other (See Comments)     Patient \"goes into manic episode\"    Hydrocodone Other (See Comments)     Itch  Tolerates percocet    Hydrocodone-Acetaminophen Itching    Methotrexate Muscle Pain (Myalgia) and Other (See Comments)     Nausea, headache  Nausea, headache      Prednisone Nausea and Vomiting     Problem List:    Patient Active Problem List    Diagnosis Date Noted    Wegener's granulomatosis without renal involvement (H) 11/20/2023     Priority: Medium    Sjogren syndrome with keratoconjunctivitis 11/20/2023     Priority: Medium    Vasculitis 08/08/2023     Priority: Medium    Panic attack 05/30/2023     Priority: Medium    Arcuate uterus 09/21/2022     Priority: Medium     Formatting of this note might be different from the original. Seen on [facility]      Carpal tunnel syndrome of right wrist 09/08/2022     Priority: Medium    Episodic mood disorder 01/02/2020     Priority: Medium    Generalized anxiety disorder 03/20/2019     Priority: Medium    Attention deficit hyperactivity disorder (ADHD), predominantly inattentive type 10/26/2018     Priority: Medium     Formatting of this note might be different from " "the original. Started on Adderall in [place].      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 Orbital Biopsy + with Dr. Barreto June 2011 S/p removal        Past Medical History:    Past Medical History:   Diagnosis Date    Wegener's granulomatosis (granulomatosis with polyangiitis)      Past Surgical History:    Past Surgical History:   Procedure Laterality Date    lacrimal gland biopsy  2010     Family History:    Family History   Problem Relation Age of Onset    Glaucoma Maternal Grandmother     Retinal detachment Maternal Grandmother     Macular Degeneration Maternal Grandfather     Glaucoma Maternal Grandfather     Diabetes Maternal Grandfather     Diabetes Mother     Hypertension Father     Hypertension Paternal Grandfather      Social History:  Marital Status:  Single [1]  Social History     Tobacco Use    Smoking status: Every Day    Smokeless tobacco: Never      Medications:    amphetamine-dextroamphetamine (ADDERALL XR) 30 MG 24 hr capsule  azaTHIOprine 100 MG TABS  LORazepam (ATIVAN) 1 MG tablet  oxyCODONE (ROXICODONE) 5 MG tablet  promethazine (PHENERGAN) 25 MG tablet  RABEprazole (ACIPHEX) 20 MG EC tablet      Review of Systems   All other systems reviewed and are negative.      PE   BP: 119/79  Pulse: 99  Temp: 97.8  F (36.6  C)  Resp: 22  Height: 165.1 cm (5' 5\")  Weight: 74.4 kg (164 lb)  SpO2: 97 %  Physical Exam  Vitals reviewed.   Constitutional:       Comments: Tired appearing.  Coughing.   HENT:      Head: Normocephalic and atraumatic.      Right Ear: External ear normal.      Left Ear: External ear normal.      Nose: Nose normal.      Mouth/Throat:      Mouth: Mucous membranes are moist.      Pharynx: Oropharynx is clear.   Eyes:      Extraocular Movements: Extraocular movements " intact.      Conjunctiva/sclera: Conjunctivae normal.      Pupils: Pupils are equal, round, and reactive to light.   Cardiovascular:      Rate and Rhythm: Normal rate and regular rhythm.   Pulmonary:      Effort: Pulmonary effort is normal.   Musculoskeletal:         General: Normal range of motion.      Cervical back: Normal range of motion.   Skin:     General: Skin is warm and dry.   Neurological:      Mental Status: She is alert and oriented to person, place, and time.   Psychiatric:         Behavior: Behavior normal.         ED COURSE and MDM   2238.  Patient with systemic symptoms, as above.  Fluid bolus, lab values pending.  No change to antibiotic recommended.  No evidence of hives on her person.  Low concern for allergic reaction.  Continue with azithromycin.    2349.  Clinical diagnosis of pneumonia given in the clinic.  Continue antibiotics as above.  Lab work reviewed with the patient, encouraging.  Fluid bolus provided.  Patient continues to be uncomfortable, oxycodone 5 mg oral dose given.  Continue Tylenol at home.    Electronic medical chart reviewed, including medical problems, medications, medical allergies, social history.  Recent hospitalizations and surgical procedures reviewed.  Recent clinic visits and consultations reviewed.  Recent labs and test results reviewed.  Nursing notes reviewed.    The patient, their parent if applicable, and/or their medical decision maker(s) and I have reviewed all of the available historical information, applicable PMH, physical exam findings, and objective diagnostic data gathered during this ED visit.  We then discussed all work-up options and then together agreed upon the course taken during this visit.  The ultimate disposition and plan was a cooperative decision made between myself and the patient, their parent if applicable, and/or their legal decision maker(s).  The risks and benefits of all decisions made during this visit were discussed to the best of my  abilities given the circumstances, and all parties are understanding of the pertinent ramifications of these decisions.      LABS  Labs Ordered and Resulted from Time of ED Arrival to Time of ED Departure   BASIC METABOLIC PANEL - Abnormal       Result Value    Sodium 141      Potassium 4.3      Chloride 105      Carbon Dioxide (CO2) 26      Anion Gap 10      Urea Nitrogen 11.3      Creatinine 0.68      GFR Estimate >90      Calcium 9.7      Glucose 114 (*)    CBC WITH PLATELETS AND DIFFERENTIAL - Abnormal    WBC Count 4.8      RBC Count 4.75      Hemoglobin 14.7      Hematocrit 43.8      MCV 92      MCH 30.9      MCHC 33.6      RDW 11.9      Platelet Count 281      % Neutrophils 81      % Lymphocytes 14      % Monocytes 5      % Eosinophils 0      % Basophils 0      % Immature Granulocytes 0      NRBCs per 100 WBC 0      Absolute Neutrophils 3.9      Absolute Lymphocytes 0.7 (*)     Absolute Monocytes 0.2      Absolute Eosinophils 0.0      Absolute Basophils 0.0      Absolute Immature Granulocytes 0.0      Absolute NRBCs 0.0     INFLUENZA A/B, RSV AND SARS-COV2 PCR - Normal    Influenza A PCR Negative      Influenza B PCR Negative      RSV PCR Negative      SARS CoV2 PCR Negative     RBC AND PLATELET MORPHOLOGY    RBC Morphology Confirmed RBC Indices      Platelet Assessment        Value: Automated Count Confirmed. Platelet morphology is normal.       IMAGING  Images reviewed by me.  Radiology report also reviewed.  No orders to display       Procedures    Medications   ondansetron (ZOFRAN) injection 4 mg (has no administration in time range)   oxyCODONE (ROXICODONE) tablet 5 mg (has no administration in time range)   sodium chloride 0.9% BOLUS 1,000 mL (1,000 mLs Intravenous $New Bag 3/3/25 2300)   acetaminophen (TYLENOL) tablet 1,000 mg (1,000 mg Oral $Given 3/3/25 2253)         IMPRESSION       ICD-10-CM    1. Community acquired pneumonia, unspecified laterality  J18.9     clinical dx               Medication  List      There are no discharge medications for this visit.                             Mikel Gardner MD  03/03/25 1974

## 2025-03-04 NOTE — ED TRIAGE NOTES
Diagnosed with pneumonia today. Reports history of asthma. Started taking prednisone and zpack today. Patient developed hives on face, hands and legs around an hour ago. Took benadryl taken at 8pm with some relief in hives. Reports worsening shortness of breath.       Triage Assessment (Adult)       Row Name 03/03/25 2932          Triage Assessment    Airway WDL WDL        Respiratory WDL    Respiratory WDL X;cough;rhythm/pattern     Rhythm/Pattern, Respiratory shortness of breath        Skin Circulation/Temperature WDL    Skin Circulation/Temperature WDL WDL        Cardiac WDL    Cardiac WDL WDL        Peripheral/Neurovascular WDL    Peripheral Neurovascular WDL WDL        Cognitive/Neuro/Behavioral WDL    Cognitive/Neuro/Behavioral WDL WDL

## 2025-05-19 ENCOUNTER — HOSPITAL ENCOUNTER (EMERGENCY)
Facility: CLINIC | Age: 40
Discharge: HOME | End: 2025-05-19
Attending: FAMILY MEDICINE | Admitting: FAMILY MEDICINE

## 2025-05-19 VITALS
HEART RATE: 78 BPM | WEIGHT: 171 LBS | DIASTOLIC BLOOD PRESSURE: 77 MMHG | TEMPERATURE: 97.7 F | OXYGEN SATURATION: 98 % | SYSTOLIC BLOOD PRESSURE: 127 MMHG | BODY MASS INDEX: 28.49 KG/M2 | HEIGHT: 65 IN | RESPIRATION RATE: 16 BRPM

## 2025-05-19 DIAGNOSIS — L24.9 IRRITANT CONTACT DERMATITIS, UNSPECIFIED TRIGGER: ICD-10-CM

## 2025-05-19 PROCEDURE — 99283 EMERGENCY DEPT VISIT LOW MDM: CPT | Performed by: FAMILY MEDICINE

## 2025-05-19 RX ORDER — OXYCODONE HYDROCHLORIDE 5 MG/1
5 TABLET ORAL EVERY 6 HOURS PRN
Qty: 6 TABLET | Refills: 0 | Status: SHIPPED | OUTPATIENT
Start: 2025-05-19

## 2025-05-19 RX ORDER — PREDNISONE 10 MG/1
TABLET ORAL
Qty: 63 TABLET | Refills: 0 | Status: SHIPPED | OUTPATIENT
Start: 2025-05-19

## 2025-05-19 ASSESSMENT — ACTIVITIES OF DAILY LIVING (ADL): ADLS_ACUITY_SCORE: 41

## 2025-05-19 NOTE — Clinical Note
Andreina Moeller was seen and treated in our emergency department on 5/19/2025.  She may return to work on 05/21/2025.       If you have any questions or concerns, please don't hesitate to call.      Craig Toth MD

## 2025-05-19 NOTE — ED PROVIDER NOTES
"  History     Chief Complaint   Patient presents with    Rash     HPI  Andreina Moeller is a 39 year old female who presents with ANCA associated vasculitis, Sjogren's syndrome, Wegener's granulomatosis.  She is followed by rheumatology.  She is on chronic azathioprine and no recent corticosteroids.  Yesterday she developed a pruritic rash starting with pruritus only on the dorsal hands and palms and that progressed to swelling and patchy erythema as shown in images below.  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  And had no other obvious known exposures.  She presents here with pain and burning in the hands as well as a patchy rash that symmetric bilaterally.  She has no systemic symptoms there is no associated fever chest pain shortness of breath.    Allergies:  Allergies   Allergen Reactions    Cellcept [Mycophenolate] Nausea and Vomiting    Gabapentin Other (See Comments)     Patient \"goes into manic episode\"    Hydrocodone Other (See Comments)     Itch  Tolerates percocet    Hydrocodone-Acetaminophen Itching    Methotrexate Muscle Pain (Myalgia) and Other (See Comments)     Nausea, headache  Nausea, headache      Prednisone Nausea and Vomiting       Problem List:    Patient Active Problem List    Diagnosis Date Noted    Wegener's granulomatosis without renal involvement (H) 11/20/2023     Priority: Medium    Sjogren syndrome with keratoconjunctivitis 11/20/2023     Priority: Medium    Vasculitis 08/08/2023     Priority: Medium    Panic attack 05/30/2023     Priority: Medium    Arcuate uterus 09/21/2022     Priority: Medium     Formatting of this note might be different from the original. Seen on [facility]      Carpal tunnel syndrome of right wrist 09/08/2022     Priority: Medium    Episodic mood disorder 01/02/2020     Priority: Medium    Generalized anxiety disorder 03/20/2019     Priority: Medium    Attention " "deficit hyperactivity disorder (ADHD), predominantly inattentive type 10/26/2018     Priority: Medium     Formatting of this note might be different from the original. Started on Adderall in [place].      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 Orbital Biopsy + with Dr. Barreto June 2011 S/p removal          Past Medical History:    Past Medical History:   Diagnosis Date    Wegener's granulomatosis (granulomatosis with polyangiitis)        Past Surgical History:    Past Surgical History:   Procedure Laterality Date    lacrimal gland biopsy  2010       Family History:    Family History   Problem Relation Age of Onset    Glaucoma Maternal Grandmother     Retinal detachment Maternal Grandmother     Macular Degeneration Maternal Grandfather     Glaucoma Maternal Grandfather     Diabetes Maternal Grandfather     Diabetes Mother     Hypertension Father     Hypertension Paternal Grandfather        Social History:  Marital Status:  Single [1]  Social History     Tobacco Use    Smoking status: Every Day    Smokeless tobacco: Never        Medications:    amphetamine-dextroamphetamine (ADDERALL XR) 30 MG 24 hr capsule  azaTHIOprine 100 MG TABS  LORazepam (ATIVAN) 1 MG tablet  oxyCODONE (ROXICODONE) 5 MG tablet  promethazine (PHENERGAN) 25 MG tablet  RABEprazole (ACIPHEX) 20 MG EC tablet          Review of Systems  ROS:  5 point ROS negative except as noted above in HPI, including Gen., Resp., CV, GI &  system review.      Physical Exam   BP: 127/77  Pulse: 78  Temp: 97.7  F (36.5  C)  Resp: 16  Height: 165.1 cm (5' 5\")  Weight: 77.6 kg (171 lb)  SpO2: 98 %      Physical Exam  Constitutional:       General: She is in acute distress.      Appearance: She is not diaphoretic.   Neurological:      Mental Status: She " is alert.       There is a patchy erythema and swelling  over the hands, the dorsum and the palms and extending up the wrist in a patchy erythema that is symmetric bilaterally not consistent with infectious dermatitis and more consistent with a contact irritant dermatitis.  No open skin.  Normal distal coloration.  She has intact range of motion of the fingers.                ED Course        Procedures              Critical Care time:  none     None         No results found for this or any previous visit (from the past 24 hours).    Medications - No data to display    Assessments & Plan (with Medical Decision Making)     MDM; Andreina Moeller is a 39 year old female presenting with a bilateral hand erythema swelling and findings most consistent with an irritant contact dermatitis but she is unaware of what her exposure was.  She also has a known Wegener's granulomatosis as well as Sjogren's but the rash appears to be more consistent with a contact dermatitis.  Plan to treat with prednisone.  I given her enough of a course of 10 mg on taper from 60 mg tapering every 3 days over 18 days as if this was a poison ivy type of rash but and noted that should she improved significantly over the next several days that 4 to 5 days may be sufficient.  She should also let her rheumatologist know as vasculitis can have some atypical presentations.  We also discussed trying to avoid what ever it was that she was exposed to.  She asked for something additionally for pain I recommend that she take ibuprofen scheduled as well as Tylenol scheduled that the steroid should offer benefit in the next 6 hours and I gave her 6 oxycodone for breakthrough pain.  Precautions are for return.  Additional recommendations as below    I have reviewed the nursing notes.    I have reviewed the findings, diagnosis, plan and need for follow up with the patient.           Medical Decision Making  The patient's presentation was of low complexity (an  acute and uncomplicated illness or injury).    The patient's evaluation involved:  history and exam without other MDM data elements    The patient's management necessitated moderate risk (prescription drug management including medications given in the ED).        New Prescriptions    No medications on file       Final diagnoses:   Irritant contact dermatitis, unspecified trigger - avoid further repeat exposure. take prednisone on taper over 18 days. let your rheumatologist know.  return for signs infection.  use aquaphor on the hands 2-3 days daily. take zyrtec 10 mg orally 1-2x/day  follow-up clinic       5/19/2025   [facility] EMERGENCY DEPT       Craig Toth MD  05/19/25 3325

## 2025-05-19 NOTE — DISCHARGE INSTRUCTIONS
ICD-10-CM    1. Irritant contact dermatitis, unspecified trigger  L24.9     avoid further repeat exposure. take prednisone on taper over 18 days. let your rheumatologist know.  return for signs infection.  use aquaphor on the hands 2-3 days daily. take zyrtec 10 mg orally 1-2x/day  follow-up clinic

## 2025-05-19 NOTE — ED TRIAGE NOTES
Patient presents with complaints of a rash and swelling on her hands, wrists, forearms  and elbows that bwegan yeesterday with itching on her palms, developing into a spreading rash overnight. Patient took benadryl x 2 with no improvement in her symptoms.     Triage Assessment (Adult)       Row Name 05/19/25 1541          Triage Assessment    Airway WDL WDL        Respiratory WDL    Respiratory WDL WDL        Skin Circulation/Temperature WDL    Skin Circulation/Temperature WDL WDL        Cardiac WDL    Cardiac WDL WDL        Peripheral/Neurovascular WDL    Peripheral Neurovascular WDL WDL        Cognitive/Neuro/Behavioral WDL    Cognitive/Neuro/Behavioral WDL WDL

## 2025-07-12 ENCOUNTER — HEALTH MAINTENANCE LETTER (OUTPATIENT)
Age: 40
End: 2025-07-12

## 2025-08-08 ENCOUNTER — HOSPITAL ENCOUNTER (EMERGENCY)
Facility: CLINIC | Age: 40
Discharge: LEFT BEFORE DISCHARGE | End: 2025-08-08

## 2025-08-08 PROCEDURE — 999N000104 HC STATISTIC NO CHARGE

## 2025-08-12 ENCOUNTER — APPOINTMENT (OUTPATIENT)
Dept: CT IMAGING | Facility: HOSPITAL | Age: 40
End: 2025-08-12

## 2025-08-12 ENCOUNTER — HOSPITAL ENCOUNTER (EMERGENCY)
Facility: HOSPITAL | Age: 40
Discharge: HOME | End: 2025-08-12

## 2025-08-12 VITALS
RESPIRATION RATE: 18 BRPM | SYSTOLIC BLOOD PRESSURE: 94 MMHG | OXYGEN SATURATION: 98 % | DIASTOLIC BLOOD PRESSURE: 51 MMHG | HEART RATE: 60 BPM | BODY MASS INDEX: 28.49 KG/M2 | WEIGHT: 171 LBS | TEMPERATURE: 97.4 F | HEIGHT: 65 IN

## 2025-08-12 DIAGNOSIS — R10.84 GENERALIZED ABDOMINAL PAIN: ICD-10-CM

## 2025-08-12 DIAGNOSIS — K52.9 ENTEROCOLITIS: Primary | ICD-10-CM

## 2025-08-12 LAB
ALBUMIN SERPL BCG-MCNC: 4.4 G/DL (ref 3.5–5.2)
ALBUMIN UR-MCNC: 30 MG/DL
ALP SERPL-CCNC: 108 U/L (ref 40–150)
ALT SERPL W P-5'-P-CCNC: 22 U/L (ref 0–50)
ANION GAP SERPL CALCULATED.3IONS-SCNC: 15 MMOL/L (ref 7–15)
APPEARANCE UR: ABNORMAL
AST SERPL W P-5'-P-CCNC: 19 U/L (ref 0–45)
BACTERIA #/AREA URNS HPF: ABNORMAL /HPF
BASOPHILS # BLD AUTO: 0 10E3/UL (ref 0–0.2)
BASOPHILS NFR BLD AUTO: 0 %
BILIRUB SERPL-MCNC: 0.5 MG/DL
BILIRUB UR QL STRIP: NEGATIVE
BUN SERPL-MCNC: 10.9 MG/DL (ref 6–20)
CALCIUM SERPL-MCNC: 9.6 MG/DL (ref 8.8–10.4)
CHLORIDE SERPL-SCNC: 103 MMOL/L (ref 98–107)
COLOR UR AUTO: YELLOW
CREAT SERPL-MCNC: 0.69 MG/DL (ref 0.51–0.95)
CRP SERPL-MCNC: 23.2 MG/L
EGFRCR SERPLBLD CKD-EPI 2021: >90 ML/MIN/1.73M2
EOSINOPHIL # BLD AUTO: 0 10E3/UL (ref 0–0.7)
EOSINOPHIL NFR BLD AUTO: 0 %
ERYTHROCYTE [DISTWIDTH] IN BLOOD BY AUTOMATED COUNT: 12 % (ref 10–15)
ERYTHROCYTE [SEDIMENTATION RATE] IN BLOOD BY WESTERGREN METHOD: 9 MM/HR (ref 0–20)
GLUCOSE SERPL-MCNC: 128 MG/DL (ref 70–99)
GLUCOSE UR STRIP-MCNC: NEGATIVE MG/DL
HCG INTACT+B SERPL-ACNC: <1 MIU/ML
HCO3 SERPL-SCNC: 22 MMOL/L (ref 22–29)
HCT VFR BLD AUTO: 46.1 % (ref 35–47)
HGB BLD-MCNC: 16.4 G/DL (ref 11.7–15.7)
HGB UR QL STRIP: ABNORMAL
HOLD SPECIMEN: NORMAL
HYALINE CASTS: 4 /LPF
IMM GRANULOCYTES # BLD: 0 10E3/UL
IMM GRANULOCYTES NFR BLD: 0 %
KETONES UR STRIP-MCNC: 10 MG/DL
LACTATE SERPL-SCNC: 0.9 MMOL/L (ref 0.7–2)
LEUKOCYTE ESTERASE UR QL STRIP: NEGATIVE
LIPASE SERPL-CCNC: 88 U/L (ref 13–60)
LYMPHOCYTES # BLD AUTO: 0.4 10E3/UL (ref 0.8–5.3)
LYMPHOCYTES NFR BLD AUTO: 4 %
MCH RBC QN AUTO: 31.2 PG (ref 26.5–33)
MCHC RBC AUTO-ENTMCNC: 35.6 G/DL (ref 31.5–36.5)
MCV RBC AUTO: 88 FL (ref 78–100)
MONOCYTES # BLD AUTO: 0.4 10E3/UL (ref 0–1.3)
MONOCYTES NFR BLD AUTO: 4 %
MUCOUS THREADS #/AREA URNS LPF: PRESENT /LPF
NEUTROPHILS # BLD AUTO: 8.8 10E3/UL (ref 1.6–8.3)
NEUTROPHILS NFR BLD AUTO: 91 %
NITRATE UR QL: NEGATIVE
NRBC # BLD AUTO: 0 10E3/UL
NRBC BLD AUTO-RTO: 0 /100
PH UR STRIP: 6.5 [PH] (ref 5–7)
PLATELET # BLD AUTO: 347 10E3/UL (ref 150–450)
POTASSIUM SERPL-SCNC: 3.4 MMOL/L (ref 3.4–5.3)
PROT SERPL-MCNC: 7.8 G/DL (ref 6.4–8.3)
RBC # BLD AUTO: 5.26 10E6/UL (ref 3.8–5.2)
RBC URINE: 0 /HPF
SODIUM SERPL-SCNC: 140 MMOL/L (ref 135–145)
SP GR UR STRIP: 1.03 (ref 1–1.03)
SQUAMOUS EPITHELIAL: 44 /HPF
UROBILINOGEN UR STRIP-MCNC: NORMAL MG/DL
WBC # BLD AUTO: 9.7 10E3/UL (ref 4–11)
WBC URINE: <1 /HPF

## 2025-08-12 PROCEDURE — 36415 COLL VENOUS BLD VENIPUNCTURE: CPT | Performed by: STUDENT IN AN ORGANIZED HEALTH CARE EDUCATION/TRAINING PROGRAM

## 2025-08-12 PROCEDURE — 83605 ASSAY OF LACTIC ACID: CPT

## 2025-08-12 PROCEDURE — 250N000011 HC RX IP 250 OP 636

## 2025-08-12 PROCEDURE — 85025 COMPLETE CBC W/AUTO DIFF WBC: CPT

## 2025-08-12 PROCEDURE — 80053 COMPREHEN METABOLIC PANEL: CPT | Performed by: STUDENT IN AN ORGANIZED HEALTH CARE EDUCATION/TRAINING PROGRAM

## 2025-08-12 PROCEDURE — 36415 COLL VENOUS BLD VENIPUNCTURE: CPT

## 2025-08-12 PROCEDURE — 81001 URINALYSIS AUTO W/SCOPE: CPT

## 2025-08-12 PROCEDURE — 86140 C-REACTIVE PROTEIN: CPT

## 2025-08-12 PROCEDURE — 84702 CHORIONIC GONADOTROPIN TEST: CPT

## 2025-08-12 PROCEDURE — 84702 CHORIONIC GONADOTROPIN TEST: CPT | Performed by: STUDENT IN AN ORGANIZED HEALTH CARE EDUCATION/TRAINING PROGRAM

## 2025-08-12 PROCEDURE — 96375 TX/PRO/DX INJ NEW DRUG ADDON: CPT

## 2025-08-12 PROCEDURE — 83690 ASSAY OF LIPASE: CPT

## 2025-08-12 PROCEDURE — 83690 ASSAY OF LIPASE: CPT | Performed by: STUDENT IN AN ORGANIZED HEALTH CARE EDUCATION/TRAINING PROGRAM

## 2025-08-12 PROCEDURE — 74177 CT ABD & PELVIS W/CONTRAST: CPT

## 2025-08-12 PROCEDURE — 250N000013 HC RX MED GY IP 250 OP 250 PS 637

## 2025-08-12 PROCEDURE — 96374 THER/PROPH/DIAG INJ IV PUSH: CPT | Mod: 59

## 2025-08-12 PROCEDURE — 81001 URINALYSIS AUTO W/SCOPE: CPT | Performed by: STUDENT IN AN ORGANIZED HEALTH CARE EDUCATION/TRAINING PROGRAM

## 2025-08-12 PROCEDURE — 96361 HYDRATE IV INFUSION ADD-ON: CPT

## 2025-08-12 PROCEDURE — 258N000003 HC RX IP 258 OP 636

## 2025-08-12 PROCEDURE — 250N000011 HC RX IP 250 OP 636: Performed by: STUDENT IN AN ORGANIZED HEALTH CARE EDUCATION/TRAINING PROGRAM

## 2025-08-12 PROCEDURE — 85014 HEMATOCRIT: CPT | Performed by: STUDENT IN AN ORGANIZED HEALTH CARE EDUCATION/TRAINING PROGRAM

## 2025-08-12 PROCEDURE — 80053 COMPREHEN METABOLIC PANEL: CPT

## 2025-08-12 PROCEDURE — 85652 RBC SED RATE AUTOMATED: CPT

## 2025-08-12 PROCEDURE — 99285 EMERGENCY DEPT VISIT HI MDM: CPT | Mod: 25

## 2025-08-12 RX ORDER — ONDANSETRON 4 MG/1
4 TABLET, ORALLY DISINTEGRATING ORAL EVERY 8 HOURS PRN
Qty: 10 TABLET | Refills: 0 | Status: SHIPPED | OUTPATIENT
Start: 2025-08-12 | End: 2025-08-15

## 2025-08-12 RX ORDER — MAGNESIUM HYDROXIDE/ALUMINUM HYDROXICE/SIMETHICONE 120; 1200; 1200 MG/30ML; MG/30ML; MG/30ML
15 SUSPENSION ORAL ONCE
Status: COMPLETED | OUTPATIENT
Start: 2025-08-12 | End: 2025-08-12

## 2025-08-12 RX ORDER — KETOROLAC TROMETHAMINE 15 MG/ML
15 INJECTION, SOLUTION INTRAMUSCULAR; INTRAVENOUS ONCE
Status: COMPLETED | OUTPATIENT
Start: 2025-08-12 | End: 2025-08-12

## 2025-08-12 RX ORDER — DIPHENHYDRAMINE HYDROCHLORIDE 50 MG/ML
25 INJECTION, SOLUTION INTRAMUSCULAR; INTRAVENOUS ONCE
Status: COMPLETED | OUTPATIENT
Start: 2025-08-12 | End: 2025-08-12

## 2025-08-12 RX ORDER — OMEPRAZOLE 20 MG/1
20 CAPSULE, DELAYED RELEASE ORAL DAILY
Qty: 30 CAPSULE | Refills: 0 | Status: SHIPPED | OUTPATIENT
Start: 2025-08-12 | End: 2025-09-11

## 2025-08-12 RX ORDER — ONDANSETRON 2 MG/ML
4 INJECTION INTRAMUSCULAR; INTRAVENOUS
Status: COMPLETED | OUTPATIENT
Start: 2025-08-12 | End: 2025-08-12

## 2025-08-12 RX ORDER — MORPHINE SULFATE 2 MG/ML
2 INJECTION, SOLUTION INTRAMUSCULAR; INTRAVENOUS ONCE
Refills: 0 | Status: COMPLETED | OUTPATIENT
Start: 2025-08-12 | End: 2025-08-12

## 2025-08-12 RX ORDER — CEPHALEXIN 500 MG/1
500 CAPSULE ORAL 4 TIMES DAILY
Qty: 28 CAPSULE | Refills: 0 | Status: SHIPPED | OUTPATIENT
Start: 2025-08-12 | End: 2025-08-19

## 2025-08-12 RX ORDER — IOPAMIDOL 755 MG/ML
84 INJECTION, SOLUTION INTRAVASCULAR ONCE
Status: COMPLETED | OUTPATIENT
Start: 2025-08-12 | End: 2025-08-12

## 2025-08-12 RX ADMIN — DIPHENHYDRAMINE HYDROCHLORIDE 25 MG: 50 INJECTION, SOLUTION INTRAMUSCULAR; INTRAVENOUS at 11:57

## 2025-08-12 RX ADMIN — ALUMINUM HYDROXIDE, MAGNESIUM HYDROXIDE, AND SIMETHICONE 15 ML: 200; 200; 20 SUSPENSION ORAL at 13:35

## 2025-08-12 RX ADMIN — MORPHINE SULFATE 2 MG: 2 INJECTION, SOLUTION INTRAMUSCULAR; INTRAVENOUS at 11:21

## 2025-08-12 RX ADMIN — KETOROLAC TROMETHAMINE 15 MG: 15 INJECTION, SOLUTION INTRAMUSCULAR; INTRAVENOUS at 12:41

## 2025-08-12 RX ADMIN — ONDANSETRON 4 MG: 2 INJECTION, SOLUTION INTRAMUSCULAR; INTRAVENOUS at 10:28

## 2025-08-12 RX ADMIN — PROCHLORPERAZINE EDISYLATE 10 MG: 5 INJECTION INTRAMUSCULAR; INTRAVENOUS at 11:57

## 2025-08-12 RX ADMIN — SODIUM CHLORIDE 1000 ML: 0.9 INJECTION, SOLUTION INTRAVENOUS at 11:21

## 2025-08-12 RX ADMIN — IOPAMIDOL 84 ML: 755 INJECTION, SOLUTION INTRAVENOUS at 12:28

## 2025-08-12 ASSESSMENT — ACTIVITIES OF DAILY LIVING (ADL)
ADLS_ACUITY_SCORE: 41
ADLS_ACUITY_SCORE: 45
ADLS_ACUITY_SCORE: 45
ADLS_ACUITY_SCORE: 41